# Patient Record
Sex: MALE | Race: ASIAN | NOT HISPANIC OR LATINO | URBAN - METROPOLITAN AREA
[De-identification: names, ages, dates, MRNs, and addresses within clinical notes are randomized per-mention and may not be internally consistent; named-entity substitution may affect disease eponyms.]

---

## 2020-10-08 ENCOUNTER — OFFICE VISIT (OUTPATIENT)
Dept: URBAN - METROPOLITAN AREA CLINIC 82 | Facility: CLINIC | Age: 45
End: 2020-10-08
Payer: COMMERCIAL

## 2020-10-08 DIAGNOSIS — K74.4 SECONDARY BILIARY CIRRHOSIS: ICD-10-CM

## 2020-10-08 PROCEDURE — 99214 OFFICE O/P EST MOD 30 MIN: CPT | Performed by: INTERNAL MEDICINE

## 2020-10-08 PROCEDURE — G8420 CALC BMI NORM PARAMETERS: HCPCS | Performed by: INTERNAL MEDICINE

## 2020-10-08 RX ORDER — SPIRONOLACTONE 100 MG/1
1 TABLET TABLET, FILM COATED ORAL BID
Qty: 60 TABLET | Refills: 6 | OUTPATIENT
Start: 2020-10-08 | End: 2021-05-06

## 2020-10-08 RX ORDER — FUROSEMIDE 40 MG/1
1 TABLET TABLET ORAL BID
Qty: 60 TABLET | Refills: 6 | OUTPATIENT
Start: 2020-10-08

## 2020-10-08 NOTE — HPI-TODAY'S VISIT:
The patient is currently following up for PSC.  Currently seems to be at his baseline.  He complains of chronic low energy that has not changed.  Complains of easily fatigued, dizziness in the AM.  Also has chronic LE edema that might have been worse in the past few days.  Currently compliant with lasix and aldactone 1 tab BID.  No signs of overt bleeding.  Normal BMs.

## 2020-10-08 NOTE — PHYSICAL EXAM SKIN:
HR down to 45 no rashes , no suspicious lesions , no areas of discoloration , good turgor , no masses , no tenderness on palpation, jaundiced

## 2021-03-11 ENCOUNTER — OFFICE VISIT (OUTPATIENT)
Dept: URBAN - METROPOLITAN AREA CLINIC 82 | Facility: CLINIC | Age: 46
End: 2021-03-11
Payer: COMMERCIAL

## 2021-03-11 DIAGNOSIS — K74.4 SECONDARY BILIARY CIRRHOSIS: ICD-10-CM

## 2021-03-11 PROCEDURE — 99213 OFFICE O/P EST LOW 20 MIN: CPT | Performed by: INTERNAL MEDICINE

## 2021-03-11 RX ORDER — FUROSEMIDE 40 MG/1
1 TABLET TABLET ORAL BID
Qty: 60 TABLET | Refills: 6
Start: 2020-10-08

## 2021-03-11 RX ORDER — SPIRONOLACTONE 100 MG/1
1 TABLET TABLET, FILM COATED ORAL BID
Qty: 60 TABLET | Refills: 6
Start: 2020-10-08

## 2021-03-11 RX ORDER — SPIRONOLACTONE 100 MG/1
1 TABLET TABLET, FILM COATED ORAL BID
Qty: 60 TABLET | Refills: 6 | COMMUNITY
Start: 2020-10-08 | End: 2021-05-06

## 2021-03-11 RX ORDER — FUROSEMIDE 40 MG/1
1 TABLET TABLET ORAL BID
Qty: 60 TABLET | Refills: 6 | COMMUNITY
Start: 2020-10-08

## 2021-03-11 NOTE — HPI-TODAY'S VISIT:
The patient is a pleasant 45-year-old male with PSC who is following up for management of his cirrhosis.  Since the last visit, he is compliant with his diuretics and somewhat compliant with his lactulose.  His main complaint has been chronic fatigue chronic joint pain and tingling at the hands and feet.  He has been evaluated at [facility] before for liver transplant and was deemed not a candidate due to lack of social support but the patient feels that he should be reevaluated because he is a better candidate now.

## 2021-03-12 LAB
A/G RATIO: 1
ALBUMIN: 3.1
ALKALINE PHOSPHATASE: 185
ALT (SGPT): 24
AST (SGOT): 49
BILIRUBIN, TOTAL: 2.8
BUN/CREATININE RATIO: 7
BUN: 6
CALCIUM: 8.5
CARBON DIOXIDE, TOTAL: 25
CHLORIDE: 108
CREATININE: 0.92
EGFR IF AFRICN AM: 114
EGFR IF NONAFRICN AM: 99
GLOBULIN, TOTAL: 3.2
GLUCOSE: 131
HEMATOCRIT: 44.5
HEMOGLOBIN: 15.3
INR: 1.2
MCH: 34.8
MCHC: 34.4
MCV: 101
NRBC: (no result)
PLATELETS: 120
POTASSIUM: 4
PROTEIN, TOTAL: 6.3
PROTHROMBIN TIME: 13
RBC: 4.4
RDW: 14.3
SODIUM: 144
WBC: 6.1

## 2021-04-02 ENCOUNTER — OFFICE VISIT (OUTPATIENT)
Dept: URBAN - METROPOLITAN AREA CLINIC 81 | Facility: CLINIC | Age: 46
End: 2021-04-02
Payer: COMMERCIAL

## 2021-04-02 DIAGNOSIS — K74.69 OTHER CIRRHOSIS OF LIVER: ICD-10-CM

## 2021-04-02 PROCEDURE — 76705 ECHO EXAM OF ABDOMEN: CPT | Performed by: INTERNAL MEDICINE

## 2021-09-16 ENCOUNTER — OFFICE VISIT (OUTPATIENT)
Dept: URBAN - METROPOLITAN AREA CLINIC 82 | Facility: CLINIC | Age: 46
End: 2021-09-16

## 2021-10-07 ENCOUNTER — OFFICE VISIT (OUTPATIENT)
Dept: URBAN - METROPOLITAN AREA CLINIC 82 | Facility: CLINIC | Age: 46
End: 2021-10-07
Payer: COMMERCIAL

## 2021-10-07 ENCOUNTER — DASHBOARD ENCOUNTERS (OUTPATIENT)
Age: 46
End: 2021-10-07

## 2021-10-07 DIAGNOSIS — K74.4 SECONDARY BILIARY CIRRHOSIS: ICD-10-CM

## 2021-10-07 DIAGNOSIS — R10.11 RUQ ABDOMINAL PAIN: ICD-10-CM

## 2021-10-07 PROBLEM — 12368000: Status: ACTIVE | Noted: 2020-10-08

## 2021-10-07 PROCEDURE — 99213 OFFICE O/P EST LOW 20 MIN: CPT | Performed by: INTERNAL MEDICINE

## 2021-10-07 RX ORDER — SPIRONOLACTONE 100 MG/1
1 TABLET TABLET, FILM COATED ORAL BID
Qty: 60 TABLET | Refills: 6 | Status: ACTIVE | COMMUNITY
Start: 2020-10-08

## 2021-10-07 RX ORDER — FUROSEMIDE 40 MG/1
1 TABLET TABLET ORAL BID
Qty: 60 TABLET | Refills: 6 | Status: ACTIVE | COMMUNITY
Start: 2020-10-08

## 2021-10-07 RX ORDER — SPIRONOLACTONE 100 MG/1
1 TABLET TABLET, FILM COATED ORAL BID
Qty: 60 TABLET | Refills: 6
Start: 2020-10-08 | End: 2022-05-05

## 2021-10-07 RX ORDER — FUROSEMIDE 40 MG/1
1 TABLET TABLET ORAL BID
Qty: 60 TABLET | Refills: 6
Start: 2020-10-08

## 2021-10-07 NOTE — HPI-TODAY'S VISIT:
The patient is a pleasant 45-year-old male with PSC who is following up for management of his cirrhosis.  Since the last visit, he is compliant with his diuretics and somewhat compliant with his lactulose.  His main complaint has been chronic fatigue chronic joint pain and tingling at the hands and feet.  He also has had some RUQ abdominal pain in the past few months and would like to have repeat imaging for evluation.  No changes in bowel habits.  No evidence of GI bleeding (melena,hematochezia).

## 2021-10-08 LAB
A/G RATIO: 0.6
ALBUMIN: 2.6
ALKALINE PHOSPHATASE: 267
ALT (SGPT): 52
AST (SGOT): 127
BILIRUBIN, TOTAL: 3.9
BUN/CREATININE RATIO: 10
BUN: 9
CALCIUM: 8.3
CARBON DIOXIDE, TOTAL: 24
CHLORIDE: 105
CREATININE: 0.94
EGFR IF AFRICN AM: 111
EGFR IF NONAFRICN AM: 96
GLOBULIN, TOTAL: 4.4
GLUCOSE: 83
INR: 1.4
POTASSIUM: 4.7
PROTEIN, TOTAL: 7
PROTHROMBIN TIME: 14.7
SODIUM: 140

## 2021-10-27 ENCOUNTER — TELEPHONE ENCOUNTER (OUTPATIENT)
Dept: URBAN - METROPOLITAN AREA CLINIC 92 | Facility: CLINIC | Age: 46
End: 2021-10-27

## 2021-10-28 ENCOUNTER — OUT OF OFFICE VISIT (OUTPATIENT)
Dept: URBAN - METROPOLITAN AREA MEDICAL CENTER 16 | Facility: MEDICAL CENTER | Age: 46
End: 2021-10-28
Payer: COMMERCIAL

## 2021-10-28 DIAGNOSIS — R93.2 ABN FIND-BILIARY TRACT: ICD-10-CM

## 2021-10-28 DIAGNOSIS — K74.69 CIRRHOSIS, CRYPTOGENIC: ICD-10-CM

## 2021-10-28 PROCEDURE — 99222 1ST HOSP IP/OBS MODERATE 55: CPT | Performed by: PHYSICIAN ASSISTANT

## 2021-10-28 PROCEDURE — G8427 DOCREV CUR MEDS BY ELIG CLIN: HCPCS | Performed by: PHYSICIAN ASSISTANT

## 2022-01-08 ENCOUNTER — HOSPITAL ENCOUNTER (INPATIENT)
Dept: HOSPITAL 5 - ED | Age: 47
LOS: 8 days | DRG: 441 | End: 2022-01-16
Attending: INTERNAL MEDICINE | Admitting: INTERNAL MEDICINE
Payer: MEDICAID

## 2022-01-08 DIAGNOSIS — I50.9: ICD-10-CM

## 2022-01-08 DIAGNOSIS — K72.10: ICD-10-CM

## 2022-01-08 DIAGNOSIS — F41.1: ICD-10-CM

## 2022-01-08 DIAGNOSIS — E66.9: ICD-10-CM

## 2022-01-08 DIAGNOSIS — E87.1: ICD-10-CM

## 2022-01-08 DIAGNOSIS — D75.1: ICD-10-CM

## 2022-01-08 DIAGNOSIS — I25.2: ICD-10-CM

## 2022-01-08 DIAGNOSIS — D69.6: ICD-10-CM

## 2022-01-08 DIAGNOSIS — K70.30: ICD-10-CM

## 2022-01-08 DIAGNOSIS — K72.00: Primary | ICD-10-CM

## 2022-01-08 DIAGNOSIS — E44.0: ICD-10-CM

## 2022-01-08 DIAGNOSIS — N17.9: ICD-10-CM

## 2022-01-08 DIAGNOSIS — E83.42: ICD-10-CM

## 2022-01-08 DIAGNOSIS — E87.5: ICD-10-CM

## 2022-01-08 DIAGNOSIS — Z20.822: ICD-10-CM

## 2022-01-08 DIAGNOSIS — I95.9: ICD-10-CM

## 2022-01-08 DIAGNOSIS — G93.41: ICD-10-CM

## 2022-01-08 DIAGNOSIS — I25.10: ICD-10-CM

## 2022-01-08 LAB
ALBUMIN SERPL-MCNC: 2.9 G/DL (ref 3.9–5)
ALT SERPL-CCNC: 58 UNITS/L (ref 7–56)
APTT BLD: 35.7 SEC. (ref 24.2–36.6)
BAND NEUTROPHILS # (MANUAL): 0 K/MM3
BILIRUB DIRECT SERPL-MCNC: 3.5 MG/DL (ref 0–0.2)
BUN SERPL-MCNC: 16 MG/DL (ref 9–20)
BUN/CREAT SERPL: 20 %
CALCIUM SERPL-MCNC: 8.3 MG/DL (ref 8.4–10.2)
HCT VFR BLD CALC: 53 % (ref 35.5–45.6)
HEMOLYSIS INDEX: 15
HGB BLD-MCNC: 17.6 GM/DL (ref 11.8–15.2)
INR PPP: 1.64 (ref 0.87–1.13)
MCHC RBC AUTO-ENTMCNC: 33 % (ref 32–34)
MCV RBC AUTO: 107 FL (ref 84–94)
MYELOCYTES # (MANUAL): 0 K/MM3
PLATELET # BLD: 82 K/MM3 (ref 140–440)
PROMYELOCYTES # (MANUAL): 0 K/MM3
RBC # BLD AUTO: 4.96 M/MM3 (ref 3.65–5.03)
TOTAL CELLS COUNTED BLD: 100

## 2022-01-08 PROCEDURE — 87040 BLOOD CULTURE FOR BACTERIA: CPT

## 2022-01-08 PROCEDURE — 87086 URINE CULTURE/COLONY COUNT: CPT

## 2022-01-08 PROCEDURE — 71046 X-RAY EXAM CHEST 2 VIEWS: CPT

## 2022-01-08 PROCEDURE — 76705 ECHO EXAM OF ABDOMEN: CPT

## 2022-01-08 PROCEDURE — 85025 COMPLETE CBC W/AUTO DIFF WBC: CPT

## 2022-01-08 PROCEDURE — 80053 COMPREHEN METABOLIC PANEL: CPT

## 2022-01-08 PROCEDURE — 36415 COLL VENOUS BLD VENIPUNCTURE: CPT

## 2022-01-08 PROCEDURE — 36600 WITHDRAWAL OF ARTERIAL BLOOD: CPT

## 2022-01-08 PROCEDURE — 80076 HEPATIC FUNCTION PANEL: CPT

## 2022-01-08 PROCEDURE — 84484 ASSAY OF TROPONIN QUANT: CPT

## 2022-01-08 PROCEDURE — 84145 PROCALCITONIN (PCT): CPT

## 2022-01-08 PROCEDURE — 82150 ASSAY OF AMYLASE: CPT

## 2022-01-08 PROCEDURE — 85610 PROTHROMBIN TIME: CPT

## 2022-01-08 PROCEDURE — 74177 CT ABD & PELVIS W/CONTRAST: CPT

## 2022-01-08 PROCEDURE — 85007 BL SMEAR W/DIFF WBC COUNT: CPT

## 2022-01-08 PROCEDURE — 80320 DRUG SCREEN QUANTALCOHOLS: CPT

## 2022-01-08 PROCEDURE — 71260 CT THORAX DX C+: CPT

## 2022-01-08 PROCEDURE — 83880 ASSAY OF NATRIURETIC PEPTIDE: CPT

## 2022-01-08 PROCEDURE — 85730 THROMBOPLASTIN TIME PARTIAL: CPT

## 2022-01-08 PROCEDURE — 70450 CT HEAD/BRAIN W/O DYE: CPT

## 2022-01-08 PROCEDURE — 83690 ASSAY OF LIPASE: CPT

## 2022-01-08 PROCEDURE — 82805 BLOOD GASES W/O2 SATURATION: CPT

## 2022-01-08 PROCEDURE — 80048 BASIC METABOLIC PNL TOTAL CA: CPT

## 2022-01-08 PROCEDURE — 83735 ASSAY OF MAGNESIUM: CPT

## 2022-01-08 PROCEDURE — 82140 ASSAY OF AMMONIA: CPT

## 2022-01-08 PROCEDURE — 71045 X-RAY EXAM CHEST 1 VIEW: CPT

## 2022-01-08 PROCEDURE — 74018 RADEX ABDOMEN 1 VIEW: CPT

## 2022-01-08 PROCEDURE — 86140 C-REACTIVE PROTEIN: CPT

## 2022-01-08 PROCEDURE — G0480 DRUG TEST DEF 1-7 CLASSES: HCPCS

## 2022-01-08 PROCEDURE — 84100 ASSAY OF PHOSPHORUS: CPT

## 2022-01-08 PROCEDURE — U0003 INFECTIOUS AGENT DETECTION BY NUCLEIC ACID (DNA OR RNA); SEVERE ACUTE RESPIRATORY SYNDROME CORONAVIRUS 2 (SARS-COV-2) (CORONAVIRUS DISEASE [COVID-19]), AMPLIFIED PROBE TECHNIQUE, MAKING USE OF HIGH THROUGHPUT TECHNOLOGIES AS DESCRIBED BY CMS-2020-01-R: HCPCS

## 2022-01-08 PROCEDURE — 93005 ELECTROCARDIOGRAM TRACING: CPT

## 2022-01-08 PROCEDURE — 94760 N-INVAS EAR/PLS OXIMETRY 1: CPT

## 2022-01-08 PROCEDURE — 82962 GLUCOSE BLOOD TEST: CPT

## 2022-01-08 PROCEDURE — 80307 DRUG TEST PRSMV CHEM ANLYZR: CPT

## 2022-01-08 NOTE — ULTRASOUND REPORT
ULTRASOUND ABDOMEN, LIMITED (RIGHT UPPER QUADRANT)



INDICATION:

Right upper quadrant pain. Abdominal distention. Jaundice.



COMPARISON:

None available.



FINDINGS:

PANCREAS: Not well visualized due to body habitus and bowel gas.

LIVER: 12.6 cm in length with a moderate coarse echogenic texture. No focal lesion and normal directi
onal blood flow in the main portal vein. 

GALLBLADDER: No significant abnormality. 

BILE DUCTS: No significant abnormality. Common bile duct measures 2.0 mm. 



FREE FLUID: None.

ADDITIONAL FINDINGS: None.



IMPRESSION:

1. Moderate diffuse nonspecific hepatocellular disease without focal abnormality. 

2. No evidence of gallstones. Suboptimal visualization of the pancreas sonographically.



Signer Name: Randy Ni MD 

Signed: 1/8/2022 12:33 PM

Workstation Name: [id number]

## 2022-01-08 NOTE — CAT SCAN REPORT
CT CHEST, ABDOMEN, AND PELVIS WITH IV CONTRAST



INDICATION / CLINICAL INFORMATION:

SOB, liver failure.



TECHNIQUE:

Axial CT images were obtained through the chest, abdomen, and pelvis after IV contrast. All CT scans 
at this location are performed using CT dose reduction for ALARA by means of automated exposure contr
ol. 



COMPARISON:

None available.



FINDINGS: Limited by moderate respiratory motion artifact

HEART: Cardiomegaly 

THORACIC AORTA: No significant abnormality. 

MEDIASTINUM and ERLIN: No significant abnormality.

LUNGS: No acute air space or interstitial disease.

PLEURA: No significant pleural effusion. No pneumothorax.

ADDITIONAL CHEST FINDINGS: Dilated main, right and left pulmonary arteries characteristic for pulmona
ry arterial hypertension. The main pulmonary artery measures 4.4 cm in right pulmonary artery measure
s 3.3 cm. No visualized pulmonary embolus. Bilateral symmetric gynecomastia



LIVER: No significant abnormality.

GALLBLADDER: No significant abnormality.  

BILE DUCTS: Mild biliary ductal dilatation. Common duct measures 7 mm.

PANCREAS: No significant abnormality.

SPLEEN: No significant abnormality.

ADRENALS: No significant abnormality.

RIGHT KIDNEY and URETER: No significant abnormality.

LEFT KIDNEY and URETER: No significant abnormality.



STOMACH and SMALL BOWEL: No significant abnormality. 

COLON: No significant abnormality. 

APPENDIX: No significant abnormality.  

PERITONEUM: No free fluid. No free air. No fluid collection.

LYMPH NODES: No significant adenopathy.

AORTA and ARTERIES: No significant abnormality. 

IVC and VEINS: No significant abnormality.



URINARY BLADDER: No significant abnormality.

REPRODUCTIVE ORGANS: No significant abnormality.



ADDITIONAL FINDINGS: None.



SKELETAL SYSTEM: Nonaggressive 2.5 cm lytic lesion with type Ia sclerotic borders likely represents f
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



IMPRESSION:

1. Cardiomegaly and pulmonary venous hypertension.

2. Mild emphysema

3. Mild biliary ductal dilatation.



Signer Name: Shaheed Presley MD 

Signed: 1/8/2022 5:05 PM

Workstation Name: [id number]

## 2022-01-08 NOTE — EVENT NOTE
ED Screening Note


Date of service: 01/08/22


Time: 09:57


ED Screening Note: 


 used


46-year-old [place] male presents to the ER today with complaints of 

difficulty breathing and lower extremity swelling.  Patient not a very good 

historian.  He states that he has something wrong with his liver, he has had it 

for years, is also been chronically jaundiced secondary to his liver issues.  He

states that in the past 4 to 5 days he has been having difficulty breathing, 

increased lower extremity swelling and generalized fatigue and shortness of 

breath.  He states that he has had ascites but the last time he had to have a 

paracentesis was about 3 4 years ago.  He claims that he has a GI specialist and

he is currently on spironolactone and Lasix.  He denies any nausea, vomiting, 

bowel changes or UTI symptoms.  He states that he has had subjective fever.  He 

states that he is to drink daily when he was younger but stopped over 10 years 

ago.  Past medical history also significant for CAD








This initial assessment/diagnostic orders/clinical plan/treatment(s) is/are 

subject to change based on patients health status, clinical progression and re-

assessment by fellow clinical providers in the ED. Further treatment and workup 

at subsequent clinical providers discretion. Patient/guardian urged not to elope

from the ED as their condition may be serious if not clinically assessed and 

managed. 





Initial orders include: 


Labs including EKG and chest x-ray

## 2022-01-08 NOTE — EMERGENCY DEPARTMENT REPORT
HPI





- General


Chief Complaint: Medical Clearance


Time Seen by Provider: 01/08/22 10:01





- HPI


HPI: 





46-year-old male with history of CAD and liver failure in the past of unclear 

etiology and chronicity presents complaining of proximately 5 days of progressi

ve dyspnea on exertion as well as lower extremity edema.  Patient is unable to 

identify the cause or the amount of time that he has had liver failure but says 

this has been an ongoing problem and he has developed ascites in the past, 

however, the last time he needed a paracentesis was approximately 4 years ago.  

He also says he has had subjective fever, mild dry cough, and intermittent chest

pain which she is unable to describe.  There are no known aggravating or 

alleviating factors.  He has not tried to take anything for his symptoms. he has

been taking his diuretics as prescribed. Denies any associated headache, vision 

change, neck pain, back pain, abdominal pain, nausea/vomiting, focal weakness, 

sensory changes, room spinning dizziness, dysuria, or any other complaints.  He 

is fully vaccinated against COVID-19.  He denies alcohol use.





ED Past Medical Hx





- Past Medical History


Previous Medical History?: Yes


Hx Heart Attack/AMI: Yes


Hx Liver Disease: Yes





- Social History


Smoking Status: Never Smoker


Substance Use Type: None





ED Review of Systems


ROS: 


Stated complaint: INSOMNIA X 3 NIGHTS


Other details as noted in HPI





Comment: All other systems reviewed and negative


Constitutional: fever, malaise.  denies: chills


Eyes: denies: eye pain, vision change


ENT: denies: throat pain, congestion


Respiratory: cough, shortness of breath


Cardiovascular: chest pain, edema.  denies: palpitations, syncope


Gastrointestinal: denies: abdominal pain, nausea, vomiting


Genitourinary: denies: dysuria, frequency


Musculoskeletal: denies: back pain, arthralgia


Skin: denies: rash, lesions


Neurological: denies: headache, weakness, numbness





Physical Exam





- Physical Exam


Vital Signs: 


                                   Vital Signs











  01/08/22





  05:38


 


Temperature 98.7 F


 


Pulse Rate 83


 


Respiratory 18





Rate 


 


Blood Pressure 116/71





[Left] 


 


O2 Sat by Pulse 100





Oximetry 











Physical Exam: 





GENERAL: Well developed and well nourished. No acute distress


HEAD: Normocephalic. No obvious signs of trauma. 


ENT: Slightly dry mucous membranes.


EYES: Bilateral scleral icterus. extraocular movements are intact. Pupils are 

equal round and reactive to light bilaterally


NECK: Supple. Full ROM is intact. Trachea is midline.


LUNGS: Tachypneic but in no respiratory distress.  Equal chest rise bilaterally.

  Slightly coarse breath sounds throughout without discrete rales, rhonchi, or 

wheezes.


CARDIOVASCULAR: Regular rate and rhythm. No obvious murmurs or rubs.


VASCULAR: Cap refill < 2 seconds. 3+ pitting edema of the bilateral lower 

extremities


ABDOMEN: Abdomen is distended but soft. There is no significant tenderness, 

guarding or rebound.


SKIN: Skin is jaundiced, warm and dry


NEURO: Patient is awake, alert, and oriented. CNs II-XII grossly intact. No 

focal deficits. Normal motor and sensory exam throughout. Normal speech.  


MUSCULOSKELETAL: No obvious deformities. No significant tenderness. Normal ROM 

throughout. 


BACK/SPINE: No midline tenderness or step-offs of the C/T/L spine. No 

costovertebral angle tenderness.





ED Course


                                   Vital Signs











  01/08/22





  05:38


 


Temperature 98.7 F


 


Pulse Rate 83


 


Respiratory 18





Rate 


 


Blood Pressure 116/71





[Left] 


 


O2 Sat by Pulse 100





Oximetry 














ED Medical Decision Making





- Lab Data


Result diagrams: 


                                 01/08/22 10:08





                                 01/08/22 10:10





                                   Lab Results











  01/08/22 01/08/22 01/08/22 Range/Units





  10:08 10:08 10:08 


 


WBC  8.1    (4.5-11.0)  K/mm3


 


RBC  4.96    (3.65-5.03)  M/mm3


 


Hgb  17.6 H    (11.8-15.2)  gm/dl


 


Hct  53.0 H    (35.5-45.6)  %


 


MCV  107 H    (84-94)  fl


 


MCH  35 H    (28-32)  pg


 


MCHC  33    (32-34)  %


 


RDW  15.7 H    (13.2-15.2)  %


 


Plt Count  82 L    (140-440)  K/mm3


 


Add Manual Diff  Complete    


 


Total Counted  100    


 


Seg Neuts % (Manual)  92.0 H    (40.0-70.0)  %


 


Band Neutrophils %  0    %


 


Lymphocytes % (Manual)  4.0 L    (13.4-35.0)  %


 


Reactive Lymphs % (Man)  1.0    %


 


Monocytes % (Manual)  3.0    (0.0-7.3)  %


 


Eosinophils % (Manual)  0    (0.0-4.3)  %


 


Basophils % (Manual)  0    (0.0-1.8)  %


 


Metamyelocytes %  0    %


 


Myelocytes %  0    %


 


Promyelocytes %  0    %


 


Blast Cells %  0    %


 


Nucleated RBC %  Not Reportable    


 


Seg Neutrophils # Man  7.5    (1.8-7.7)  K/mm3


 


Band Neutrophils #  0.0    K/mm3


 


Lymphocytes # (Manual)  0.3 L    (1.2-5.4)  K/mm3


 


Abs React Lymphs (Man)  0.1    K/mm3


 


Monocytes # (Manual)  0.2    (0.0-0.8)  K/mm3


 


Eosinophils # (Manual)  0.0    (0.0-0.4)  K/mm3


 


Basophils # (Manual)  0.0    (0.0-0.1)  K/mm3


 


Metamyelocytes #  0.0    K/mm3


 


Myelocytes #  0.0    K/mm3


 


Promyelocytes #  0.0    K/mm3


 


Blast Cells #  0.0    K/mm3


 


WBC Morphology  Not Reportable    


 


Hypersegmented Neuts  Not Reportable    


 


Hyposegmented Neuts  Not Reportable    


 


Hypogranular Neuts  Not Reportable    


 


Smudge Cells  Not Reportable    


 


Toxic Granulation  Not Reportable    


 


Toxic Vacuolation  Not Reportable    


 


Dohle Bodies  Not Reportable    


 


Pelger-Huet Anomaly  Not Reportable    


 


Scar Rods  Not Reportable    


 


Platelet Estimate  Consistent w auto    


 


Clumped Platelets  Not Reportable    


 


Plt Clumps, EDTA  Not Reportable    


 


Large Platelets  Not Reportable    


 


Giant Platelets  Few    


 


Platelet Satelliting  Not Reportable    


 


Plt Morphology Comment  Not Reportable    


 


RBC Morphology  Not Reportable    


 


Dimorphic RBCs  Not Reportable    


 


Polychromasia  Not Reportable    


 


Hypochromasia  Not Reportable    


 


Poikilocytosis  Not Reportable    


 


Anisocytosis  Not Reportable    


 


Microcytosis  Not Reportable    


 


Macrocytosis  Not Reportable    


 


Spherocytes  Not Reportable    


 


Pappenheimer Bodies  Not Reportable    


 


Sickle Cells  Not Reportable    


 


Target Cells  Few    


 


Tear Drop Cells  Not Reportable    


 


Ovalocytes  Not Reportable    


 


Helmet Cells  Not Reportable    


 


Leon-[place]aya Bodies  Not Reportable    


 


Cabot Rings  Not Reportable    


 


Swati Cells  Not Reportable    


 


Bite Cells  Not Reportable    


 


Crenated Cell  Not Reportable    


 


Elliptocytes  Not Reportable    


 


Acanthocytes (Spur)  Not Reportable    


 


Rouleaux  Not Reportable    


 


Hemoglobin C Crystals  Not Reportable    


 


Schistocytes  Not Reportable    


 


Malaria parasites  Not Reportable    


 


Arnie Bodies  Not Reportable    


 


Hem Pathologist Commnt  No    


 


PT   21.0 H   (12.2-14.9)  Sec.


 


INR   1.64 H   (0.87-1.13)  


 


APTT   35.7   (24.2-36.6)  Sec.


 


Sodium     (137-145)  mmol/L


 


Potassium     (3.6-5.0)  mmol/L


 


Chloride     ()  mmol/L


 


Carbon Dioxide     (22-30)  mmol/L


 


Anion Gap     mmol/L


 


BUN     (9-20)  mg/dL


 


Creatinine     (0.8-1.3)  mg/dL


 


Estimated GFR     ml/min


 


BUN/Creatinine Ratio     %


 


Glucose     ()  mg/dL


 


Calcium     (8.4-10.2)  mg/dL


 


Magnesium     (1.7-2.3)  mg/dL


 


Total Bilirubin     (0.1-1.2)  mg/dL


 


Direct Bilirubin     (0-0.2)  mg/dL


 


Indirect Bilirubin     mg/dL


 


AST     (5-40)  units/L


 


ALT     (7-56)  units/L


 


Alkaline Phosphatase     ()  units/L


 


Ammonia     (25-60)  umol/L


 


Troponin T    < 0.010  (0.00-0.029)  ng/mL


 


NT-Pro-B Natriuret Pep    2571 H  (0-450)  pg/mL


 


Total Protein     (6.3-8.2)  g/dL


 


Albumin     (3.9-5)  g/dL


 


Albumin/Globulin Ratio     %


 


Lipase    13  (13-60)  units/L


 


Salicylates     (2.8-20.0)  mg/dL


 


Acetaminophen     (10.0-30.0)  ug/mL


 


Plasma/Serum Alcohol     (0-0.07)  %














  01/08/22 01/08/22 01/08/22 Range/Units





  10:08 10:10 12:34 


 


WBC     (4.5-11.0)  K/mm3


 


RBC     (3.65-5.03)  M/mm3


 


Hgb     (11.8-15.2)  gm/dl


 


Hct     (35.5-45.6)  %


 


MCV     (84-94)  fl


 


MCH     (28-32)  pg


 


MCHC     (32-34)  %


 


RDW     (13.2-15.2)  %


 


Plt Count     (140-440)  K/mm3


 


Add Manual Diff     


 


Total Counted     


 


Seg Neuts % (Manual)     (40.0-70.0)  %


 


Band Neutrophils %     %


 


Lymphocytes % (Manual)     (13.4-35.0)  %


 


Reactive Lymphs % (Man)     %


 


Monocytes % (Manual)     (0.0-7.3)  %


 


Eosinophils % (Manual)     (0.0-4.3)  %


 


Basophils % (Manual)     (0.0-1.8)  %


 


Metamyelocytes %     %


 


Myelocytes %     %


 


Promyelocytes %     %


 


Blast Cells %     %


 


Nucleated RBC %     


 


Seg Neutrophils # Man     (1.8-7.7)  K/mm3


 


Band Neutrophils #     K/mm3


 


Lymphocytes # (Manual)     (1.2-5.4)  K/mm3


 


Abs React Lymphs (Man)     K/mm3


 


Monocytes # (Manual)     (0.0-0.8)  K/mm3


 


Eosinophils # (Manual)     (0.0-0.4)  K/mm3


 


Basophils # (Manual)     (0.0-0.1)  K/mm3


 


Metamyelocytes #     K/mm3


 


Myelocytes #     K/mm3


 


Promyelocytes #     K/mm3


 


Blast Cells #     K/mm3


 


WBC Morphology  TNR    


 


Hypersegmented Neuts     


 


Hyposegmented Neuts     


 


Hypogranular Neuts     


 


Smudge Cells     


 


Toxic Granulation     


 


Toxic Vacuolation     


 


Dohle Bodies     


 


Pelger-Huet Anomaly     


 


Scar Rods     


 


Platelet Estimate     


 


Clumped Platelets     


 


Plt Clumps, EDTA     


 


Large Platelets     


 


Giant Platelets     


 


Platelet Satelliting     


 


Plt Morphology Comment     


 


RBC Morphology     


 


Dimorphic RBCs     


 


Polychromasia     


 


Hypochromasia     


 


Poikilocytosis     


 


Anisocytosis     


 


Microcytosis     


 


Macrocytosis     


 


Spherocytes     


 


Pappenheimer Bodies     


 


Sickle Cells     


 


Target Cells     


 


Tear Drop Cells     


 


Ovalocytes     


 


Helmet Cells     


 


Leon-[place] Vaya Bodies     


 


Cabot Rings     


 


Swati Cells     


 


Bite Cells     


 


Crenated Cell     


 


Elliptocytes     


 


Acanthocytes (Spur)     


 


Rouleaux     


 


Hemoglobin C Crystals     


 


Schistocytes     


 


Malaria parasites     


 


Arnie Bodies     


 


Hem Pathologist Commnt     


 


PT     (12.2-14.9)  Sec.


 


INR     (0.87-1.13)  


 


APTT     (24.2-36.6)  Sec.


 


Sodium   112 L*   (137-145)  mmol/L


 


Potassium   5.0   (3.6-5.0)  mmol/L


 


Chloride   77.6 L   ()  mmol/L


 


Carbon Dioxide   26   (22-30)  mmol/L


 


Anion Gap   13   mmol/L


 


BUN   16   (9-20)  mg/dL


 


Creatinine   0.8   (0.8-1.3)  mg/dL


 


Estimated GFR   > 60   ml/min


 


BUN/Creatinine Ratio   20   %


 


Glucose   93   ()  mg/dL


 


Calcium   8.3 L   (8.4-10.2)  mg/dL


 


Magnesium   1.60 L   (1.7-2.3)  mg/dL


 


Total Bilirubin   8.90 H   (0.1-1.2)  mg/dL


 


Direct Bilirubin   3.5 H   (0-0.2)  mg/dL


 


Indirect Bilirubin   5.4   mg/dL


 


AST   118 H   (5-40)  units/L


 


ALT   58 H   (7-56)  units/L


 


Alkaline Phosphatase   359 H   ()  units/L


 


Ammonia     (25-60)  umol/L


 


Troponin T    < 0.010  (0.00-0.029)  ng/mL


 


NT-Pro-B Natriuret Pep     (0-450)  pg/mL


 


Total Protein   6.9   (6.3-8.2)  g/dL


 


Albumin   2.9 L   (3.9-5)  g/dL


 


Albumin/Globulin Ratio   0.7   %


 


Lipase     (13-60)  units/L


 


Salicylates     (2.8-20.0)  mg/dL


 


Acetaminophen     (10.0-30.0)  ug/mL


 


Plasma/Serum Alcohol     (0-0.07)  %














  01/08/22 01/08/22 01/08/22 Range/Units





  12:34 12:34 12:34 


 


WBC     (4.5-11.0)  K/mm3


 


RBC     (3.65-5.03)  M/mm3


 


Hgb     (11.8-15.2)  gm/dl


 


Hct     (35.5-45.6)  %


 


MCV     (84-94)  fl


 


MCH     (28-32)  pg


 


MCHC     (32-34)  %


 


RDW     (13.2-15.2)  %


 


Plt Count     (140-440)  K/mm3


 


Add Manual Diff     


 


Total Counted     


 


Seg Neuts % (Manual)     (40.0-70.0)  %


 


Band Neutrophils %     %


 


Lymphocytes % (Manual)     (13.4-35.0)  %


 


Reactive Lymphs % (Man)     %


 


Monocytes % (Manual)     (0.0-7.3)  %


 


Eosinophils % (Manual)     (0.0-4.3)  %


 


Basophils % (Manual)     (0.0-1.8)  %


 


Metamyelocytes %     %


 


Myelocytes %     %


 


Promyelocytes %     %


 


Blast Cells %     %


 


Nucleated RBC %     


 


Seg Neutrophils # Man     (1.8-7.7)  K/mm3


 


Band Neutrophils #     K/mm3


 


Lymphocytes # (Manual)     (1.2-5.4)  K/mm3


 


Abs React Lymphs (Man)     K/mm3


 


Monocytes # (Manual)     (0.0-0.8)  K/mm3


 


Eosinophils # (Manual)     (0.0-0.4)  K/mm3


 


Basophils # (Manual)     (0.0-0.1)  K/mm3


 


Metamyelocytes #     K/mm3


 


Myelocytes #     K/mm3


 


Promyelocytes #     K/mm3


 


Blast Cells #     K/mm3


 


WBC Morphology     


 


Hypersegmented Neuts     


 


Hyposegmented Neuts     


 


Hypogranular Neuts     


 


Smudge Cells     


 


Toxic Granulation     


 


Toxic Vacuolation     


 


Dohle Bodies     


 


Pelger-Huet Anomaly     


 


Scar Rods     


 


Platelet Estimate     


 


Clumped Platelets     


 


Plt Clumps, EDTA     


 


Large Platelets     


 


Giant Platelets     


 


Platelet Satelliting     


 


Plt Morphology Comment     


 


RBC Morphology     


 


Dimorphic RBCs     


 


Polychromasia     


 


Hypochromasia     


 


Poikilocytosis     


 


Anisocytosis     


 


Microcytosis     


 


Macrocytosis     


 


Spherocytes     


 


Pappenheimer Bodies     


 


Sickle Cells     


 


Target Cells     


 


Tear Drop Cells     


 


Ovalocytes     


 


Helmet Cells     


 


Leon-Ko Vaya Bodies     


 


Cabot Rings     


 


[place] Cells     


 


Bite Cells     


 


Crenated Cell     


 


Elliptocytes     


 


Acanthocytes (Spur)     


 


Rouleaux     


 


Hemoglobin C Crystals     


 


Schistocytes     


 


Malaria parasites     


 


Arnie Bodies     


 


Hem Pathologist Commnt     


 


PT     (12.2-14.9)  Sec.


 


INR     (0.87-1.13)  


 


APTT     (24.2-36.6)  Sec.


 


Sodium     (137-145)  mmol/L


 


Potassium     (3.6-5.0)  mmol/L


 


Chloride     ()  mmol/L


 


Carbon Dioxide     (22-30)  mmol/L


 


Anion Gap     mmol/L


 


BUN     (9-20)  mg/dL


 


Creatinine     (0.8-1.3)  mg/dL


 


Estimated GFR     ml/min


 


BUN/Creatinine Ratio     %


 


Glucose     ()  mg/dL


 


Calcium     (8.4-10.2)  mg/dL


 


Magnesium     (1.7-2.3)  mg/dL


 


Total Bilirubin     (0.1-1.2)  mg/dL


 


Direct Bilirubin     (0-0.2)  mg/dL


 


Indirect Bilirubin     mg/dL


 


AST     (5-40)  units/L


 


ALT     (7-56)  units/L


 


Alkaline Phosphatase     ()  units/L


 


Ammonia  40.0    (25-60)  umol/L


 


Troponin T     (0.00-0.029)  ng/mL


 


NT-Pro-B Natriuret Pep     (0-450)  pg/mL


 


Total Protein     (6.3-8.2)  g/dL


 


Albumin     (3.9-5)  g/dL


 


Albumin/Globulin Ratio     %


 


Lipase     (13-60)  units/L


 


Salicylates    < 0.3 L  (2.8-20.0)  mg/dL


 


Acetaminophen     (10.0-30.0)  ug/mL


 


Plasma/Serum Alcohol   < 0.01   (0-0.07)  %














  01/08/22 Range/Units





  12:34 


 


WBC   (4.5-11.0)  K/mm3


 


RBC   (3.65-5.03)  M/mm3


 


Hgb   (11.8-15.2)  gm/dl


 


Hct   (35.5-45.6)  %


 


MCV   (84-94)  fl


 


MCH   (28-32)  pg


 


MCHC   (32-34)  %


 


RDW   (13.2-15.2)  %


 


Plt Count   (140-440)  K/mm3


 


Add Manual Diff   


 


Total Counted   


 


Seg Neuts % (Manual)   (40.0-70.0)  %


 


Band Neutrophils %   %


 


Lymphocytes % (Manual)   (13.4-35.0)  %


 


Reactive Lymphs % (Man)   %


 


Monocytes % (Manual)   (0.0-7.3)  %


 


Eosinophils % (Manual)   (0.0-4.3)  %


 


Basophils % (Manual)   (0.0-1.8)  %


 


Metamyelocytes %   %


 


Myelocytes %   %


 


Promyelocytes %   %


 


Blast Cells %   %


 


Nucleated RBC %   


 


Seg Neutrophils # Man   (1.8-7.7)  K/mm3


 


Band Neutrophils #   K/mm3


 


Lymphocytes # (Manual)   (1.2-5.4)  K/mm3


 


Abs React Lymphs (Man)   K/mm3


 


Monocytes # (Manual)   (0.0-0.8)  K/mm3


 


Eosinophils # (Manual)   (0.0-0.4)  K/mm3


 


Basophils # (Manual)   (0.0-0.1)  K/mm3


 


Metamyelocytes #   K/mm3


 


Myelocytes #   K/mm3


 


Promyelocytes #   K/mm3


 


Blast Cells #   K/mm3


 


WBC Morphology   


 


Hypersegmented Neuts   


 


Hyposegmented Neuts   


 


Hypogranular Neuts   


 


Smudge Cells   


 


Toxic Granulation   


 


Toxic Vacuolation   


 


Dohle Bodies   


 


Pelger-Huet Anomaly   


 


Scar Rods   


 


Platelet Estimate   


 


Clumped Platelets   


 


Plt Clumps, EDTA   


 


Large Platelets   


 


Giant Platelets   


 


Platelet Satelliting   


 


Plt Morphology Comment   


 


RBC Morphology   


 


Dimorphic RBCs   


 


Polychromasia   


 


Hypochromasia   


 


Poikilocytosis   


 


Anisocytosis   


 


Microcytosis   


 


Macrocytosis   


 


Spherocytes   


 


Pappenheimer Bodies   


 


Sickle Cells   


 


Target Cells   


 


Tear Drop Cells   


 


Ovalocytes   


 


Helmet Cells   


 


Leon-Ko Vaya Bodies   


 


Cabot Rings   


 


[place] Cells   


 


Bite Cells   


 


Crenated Cell   


 


Elliptocytes   


 


Acanthocytes (Spur)   


 


Rouleaux   


 


Hemoglobin C Crystals   


 


Schistocytes   


 


Malaria parasites   


 


Arnie Bodies   


 


Hem Pathologist Commnt   


 


PT   (12.2-14.9)  Sec.


 


INR   (0.87-1.13)  


 


APTT   (24.2-36.6)  Sec.


 


Sodium   (137-145)  mmol/L


 


Potassium   (3.6-5.0)  mmol/L


 


Chloride   ()  mmol/L


 


Carbon Dioxide   (22-30)  mmol/L


 


Anion Gap   mmol/L


 


BUN   (9-20)  mg/dL


 


Creatinine   (0.8-1.3)  mg/dL


 


Estimated GFR   ml/min


 


BUN/Creatinine Ratio   %


 


Glucose   ()  mg/dL


 


Calcium   (8.4-10.2)  mg/dL


 


Magnesium   (1.7-2.3)  mg/dL


 


Total Bilirubin   (0.1-1.2)  mg/dL


 


Direct Bilirubin   (0-0.2)  mg/dL


 


Indirect Bilirubin   mg/dL


 


AST   (5-40)  units/L


 


ALT   (7-56)  units/L


 


Alkaline Phosphatase   ()  units/L


 


Ammonia   (25-60)  umol/L


 


Troponin T   (0.00-0.029)  ng/mL


 


NT-Pro-B Natriuret Pep   (0-450)  pg/mL


 


Total Protein   (6.3-8.2)  g/dL


 


Albumin   (3.9-5)  g/dL


 


Albumin/Globulin Ratio   %


 


Lipase   (13-60)  units/L


 


Salicylates   (2.8-20.0)  mg/dL


 


Acetaminophen  5.0 L  (10.0-30.0)  ug/mL


 


Plasma/Serum Alcohol   (0-0.07)  %














- EKG Data


-: EKG Interpreted by Me





- EKG Data





01/08/22 13:59


Normal sinus rhythm.  Right axis deviation.  QTC of 556.  Otherwise normal 

intervals.  No ectopy.  No significant ST segment abnormalities.  Nonspecific T 

wave inversions.





- Radiology Data


Radiology results: report reviewed





- Medical Decision Making





46-year-old male with history of CAD and liver failure of unclear etiology 

presents complaining of 5 days of progressive lower extremity swelling and 

dyspnea on exertion.  Also reports subjective fevers and malaise as well as 

cough and intermittent chest pain.  He is afebrile and with normal vital signs. 

 On physical examination he has bilateral scleral icterus and jaundice.  His 

abdomen is distended but soft and nontender.  He has a nonfocal neurologic exam.

  He has 3+ pitting edema bilaterally.  Lung sounds are slightly coarse but 

otherwise clear.  We will perform broad work-up with a full set of labs, EKG, 

chest x-ray as well as ultrasound of the abdomen and right upper quadrant.  We 

will continue to monitor the patient closely and give meds as appropriate.





I received a call from the lab without a critically low sodium level of 112.  

Labs further reveal normal kidney function and no other significant electrolyte 

abnormalities however T bili is elevated at 8.9 with T bili of 3.5.  BNP is 

elevated at 2571.  AST is elevated at 112 and ALT is 58.  Troponin is negative. 

 INR is slightly elevated at 1.64.  There is no significant leukocytosis or 

anemia but hemoglobin is elevated at 17.6.  Given the degree of hyponatremia, I 

have ordered normal saline at 125 mL/h.  In addition we will obtain broader set 

of labs to include ammonia level.  We will obtain CT of the head to assess for 

evidence of intracranial bleeding versus other abnormality to explain the 

patient's symptoms.  We will obtain CT of the chest/abdomen/pelvis to assess for

 evidence of pneumonia, fluid overload, colitis, 

choledocholithiasis/cholecystitis, versus other abnormality to explain the 

patient's presentation.  I immediately alerted the charge nurse Dayna that the 

patient requires a bed in the main ED with a cardiac monitor.





Chest x-ray shows cardiomegaly without CHF findings.





At 11:32 PM I spoke with Dr. Soriano of gastroenterology regarding the case.  He 

agrees with current work-up and management and will provide further inpatient 

consults.





Acetaminophen and salicylate levels are negative.  Ammonia level is within 

normal limits.





EKG shows prolonged QTC of 556.  In light of magnesium level of 1.6 I ordered 2 

mg of IV magnesium repletion.





CT of the head reveals periventricular and deep white matter lucency of unclear 

etiology which the radiologist feels may represent advanced microvascular 

ischemic changes.





CT of the chest/abdomen/pelvis reveals cardiomegaly and mildly dilated CBD of 7 

mm.





I spoke with Dr. Bansal, the on-call hospitalist regarding the case and he accepts

 the patient for admission and will assume care.


Critical Care Time: Yes


Critical care time in (mins) excluding proc time.: 60


Critical care attestation.: 


If time is entered above; I have spent that time in minutes in the direct care 

of this critically ill patient, excluding procedure time.


Critical care time was spent in the evaluation/assessment, work-up, and 

management of metabolic encephalopathy with severe hyponatremia and acute on 

chronic liver failure with elevated INR and hypomagnesemia as well as prolonged 

QTC requiring administration of IV magnesium as well as abnormal head CT finding

 and consultation with specialist with frequent repeat assessment and reevaluati

on





ED Disposition


Clinical Impression: 


 Liver failure, Metabolic encephalopathy, Hyponatremia, Cirrhosis of liver, 

Heart failure, Prolonged QT interval, Hypomagnesemia, Jaundice, Elevated INR, 

Abnormal head CT, Ischemic encephalopathy, Thrombocytopenia





Disposition: 09 ADMITTED AS INPATIENT


Is pt being admited?: Yes


Condition: Critical

## 2022-01-08 NOTE — CAT SCAN REPORT
CT HEAD WITHOUT CONTRAST



INDICATION / CLINICAL INFORMATION:

AMS.



TECHNIQUE:

All CT scans at this location are performed using CT dose reduction for ALARA by means of automated e
xposure control. 



COMPARISON:

None available.



FINDINGS:

HEMORRHAGE: No evidence of intracranial hemorrhage or extra-axial fluid collection.

EXTRA-AXIAL SPACES: Cortical sulci, sylvian fissures and basilar cisterns have an unremarkable appear
ance.

VENTRICULAR SYSTEM: Developmental asymmetry of the lateral ventricles is noted, left larger than righ
t. Lateral ventricles are otherwise unremarkable. Third ventricle is normal in size.

CEREBRAL PARENCHYMA: Periventricular and deep white matter lucency is noted. This may reflect advance
d microvascular ischemic change. Are there risk factors such as hypertension, diabetes or cigarettes 
smoking Alternatively in the setting of hypertensive crisis the possibility of PRES could be consider
ed.

MIDLINE SHIFT OR HERNIATION: There is no mass effect.

CEREBELLUM / BRAINSTEM: Brainstem and cerebellum have an unremarkable appearance.

MIDLINE STRUCTURES:No abnormalities of the pituitary gland or pineal region are identified.

INTRACRANIAL VESSELS:No abnormalities are identified on this noncontrast head CT.

ORBITS: visualized portions of the orbits have an unremarkable appearance.

SOFT TISSUES of HEAD: No significant abnormality.

CALVARIUM: Evaluation of bone windows reveals no abnormalities.

PARANASAL SINUSES / MASTOID AIR CELLS: Visualized portions of the paranasal sinuses are free from inf
lammatory mucosal disease. Mastoid air cells are normally pneumatized.



IMPRESSION:

1. Periventricular and deep white matter lucency. This is considered an abnormal finding in this 46-y
ear-old individual. Please refer to the above discussion. 

2. No additional abnormality on head CT without contrast.



Signer Name: Armando Conner MD 

Signed: 1/8/2022 4:42 PM

Workstation Name: [email or web address]

## 2022-01-08 NOTE — XRAY REPORT
CHEST 2 VIEWS 



INDICATION / CLINICAL INFORMATION:

Chest Pain/sob.



COMPARISON: 

None available.



FINDINGS:



SUPPORT DEVICES: None.

HEART / MEDIASTINUM: Cardiomegaly 

LUNGS / PLEURA: No significant pulmonary or pleural abnormality. No pneumothorax. 



ADDITIONAL FINDINGS: No significant additional findings.



IMPRESSION:

1. Cardiomegaly without CHF



Signer Name: Shaheed Presley MD 

Signed: 1/8/2022 10:36 AM

Workstation Name: [id number]

## 2022-01-08 NOTE — CONSULTATION
DATE OF CONSULTATION: 01/08/2022



REFERRING PHYSICIAN:  Fuentes Bansal M.D.



INDICATION:

1.  Cirrhosis.

2.  Liver disease.



HISTORY OF PRESENT ILLNESS:  A 46-year-old  male with history of heart 

disease and liver failure presents for evaluation.  The patient reports 

progressive dyspnea and shortness of breath with lower extremity edema.  The 

patient reports a history of cirrhosis in the past.  He does report a history of

alcohol abuse, though is a poor historian as to when he stopped, but generally 

says a few years ago.  The patient reports he has had progressive shortness of 

breath.  He denies any abdominal pain.  Denies any weight loss.  Denies any 

recent diet or medication changes.  The patient subsequently came to the 

Emergency Room, was noted to have hyponatremia and some liver abnormalities and 

was admitted and GI consulted.  No other specific complaints.  Of note, the 

patient is COVID vaccinated.



PAST MEDICAL HISTORY:

1.  Cirrhosis of unclear etiology, though suspect alcohol.

2.  Coronary artery disease.



MEDICATIONS:  Reviewed and updated in chart.



ALLERGIES:  No known drug allergies.



SOCIAL HISTORY:  Reports alcohol use, but not for recent years.



FAMILY HISTORY:  Negative for colon cancer, IBD, or liver disease.



REVIEW OF SYSTEMS:

GENERAL:  Reports some weakness.

HEENT:  No visual complaints or tinnitus.

PULMONARY:  Reports mild shortness of breath, chest pain.

GASTROINTESTINAL:  Reports mild abdominal discomfort.



All points of 10-point review of systems otherwise negative.



PHYSICAL EXAMINATION:

VITAL SIGNS:  Temperature of 97.8, pulse 85, respirations 18, blood pressure 

116/71.

GENERAL:  Fairly thin female in no acute distress.

HEENT:  Pupils round and reactive.

PULMONARY:  Clear to auscultation bilaterally.

CARDIOVASCULAR:  Regular rate and rhythm.  Normal S1, S2.

ABDOMEN:  Positive bowel sounds, soft, mildly distended.  No guarding, no 

rebound.

SKIN:  No obvious rashes.



LABORATORY DATA:  Pertinent for a white count of 8.1, hemoglobin and hematocrit 

of 17.6 and 53, platelet count 82.  INR of 1.64, sodium of 112, potassium 5, 

chloride 78, CO2 of 26, BUN and creatinine of 16 and 0.8, AST and ALT of 118 and

58, alkaline phosphatase 359 with a total bilirubin of 8.9.  Tylenol level 

negative.  Salicylate level negative.  CT abdomen and pelvis with contrast 

performed on 01/08 showed cardiomegaly with increased pulmonary vasculature, 

mild emphysema, but otherwise benign.  Ultrasound performed also on 01/08 of the

abdomen showed moderate diffuse nonspecific hepatocellular disease.  No evidence

of gallstones.



ASSESSMENT:  A 46-year-old  male presents with signs and symptoms of 

cirrhosis in the setting of reporting alcohol use, but not in recent years.  

Also, now with hyponatremia.  The patient's liver labs for the most part are 

stable.  His ultrasound and CT scan do not show severe ascites.  It is unclear 

as to the etiology of his hyponatremia.  Management as noted below.



PLAN:

1.  We will review ultrasound and CT.

2.  We will do sepsis workup including blood cultures.

3.  Hyponatremia.  Management per primary team.

4.  No obvious signs that are needed for ascites at this time.

5.  We would start gentle diuretic with Lasix and spironolactone per primary 

team.

6.  We will follow further recommendation based on progress.







DD: 01/08/2022 09:33 PM

DT: 01/08/2022 11:49 PM

TID: [id number] RECEIPT: [id number]

ENRIQUE/ESTEBAN

## 2022-01-09 LAB
ALBUMIN SERPL-MCNC: 2.1 G/DL (ref 3.9–5)
ALBUMIN SERPL-MCNC: 2.5 G/DL (ref 3.9–5)
ALT SERPL-CCNC: 48 UNITS/L (ref 7–56)
ALT SERPL-CCNC: 60 UNITS/L (ref 7–56)
ANISOCYTOSIS BLD QL SMEAR: (no result)
BAND NEUTROPHILS # (MANUAL): 0 K/MM3
BUN SERPL-MCNC: 21 MG/DL (ref 9–20)
BUN SERPL-MCNC: 28 MG/DL (ref 9–20)
BUN/CREAT SERPL: 26 %
BUN/CREAT SERPL: 31 %
CALCIUM SERPL-MCNC: 7.5 MG/DL (ref 8.4–10.2)
CALCIUM SERPL-MCNC: 8.3 MG/DL (ref 8.4–10.2)
HCT VFR BLD CALC: 50.2 % (ref 35.5–45.6)
HEMOLYSIS INDEX: 166
HEMOLYSIS INDEX: 171
HGB BLD-MCNC: 16.8 GM/DL (ref 11.8–15.2)
MCHC RBC AUTO-ENTMCNC: 33 % (ref 32–34)
MCV RBC AUTO: 107 FL (ref 84–94)
MYELOCYTES # (MANUAL): 0 K/MM3
PLATELET # BLD: 94 K/MM3 (ref 140–440)
PROMYELOCYTES # (MANUAL): 0 K/MM3
RBC # BLD AUTO: 4.68 M/MM3 (ref 3.65–5.03)
TOTAL CELLS COUNTED BLD: 100

## 2022-01-09 RX ADMIN — KETOROLAC TROMETHAMINE SCH MG: 30 INJECTION, SOLUTION INTRAMUSCULAR at 13:13

## 2022-01-09 RX ADMIN — KETOROLAC TROMETHAMINE SCH: 30 INJECTION, SOLUTION INTRAMUSCULAR at 23:29

## 2022-01-09 RX ADMIN — Medication SCH ML: at 12:55

## 2022-01-09 RX ADMIN — FAMOTIDINE SCH MG: 10 INJECTION, SOLUTION INTRAVENOUS at 13:00

## 2022-01-09 RX ADMIN — FAMOTIDINE SCH MG: 10 INJECTION, SOLUTION INTRAVENOUS at 23:17

## 2022-01-09 RX ADMIN — SODIUM CHLORIDE ONE MLS/HR: 3 INJECTION, SOLUTION INTRAVENOUS at 19:01

## 2022-01-09 RX ADMIN — KETOROLAC TROMETHAMINE SCH MG: 30 INJECTION, SOLUTION INTRAMUSCULAR at 00:48

## 2022-01-09 RX ADMIN — Medication SCH ML: at 23:17

## 2022-01-09 RX ADMIN — KETOROLAC TROMETHAMINE SCH: 30 INJECTION, SOLUTION INTRAMUSCULAR at 06:53

## 2022-01-09 NOTE — GASTROENTEROLOGY PROGRESS NOTE
Assessment and Plan


1. 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 

disease


- pt w/ ascites but no need for paracenthesis


- pt ammonia stable,


- diet as tolerated 


- Na management per primary team


- watch for signs possible alcohol withdrawal


- Na management per primary team


-will follow








Subjective


Date of service: 01/09/22


Principal diagnosis: Hyponatremia,Liver failure


Interval history: 


pt somewhat lethargic today, no specific issues overnight








Objective





- Constitutional


Vitals: 


                                        











Temp Pulse Resp BP Pulse Ox


 


 98 F   78   16   91/56   97 


 


 01/09/22 17:04  01/09/22 17:07  01/09/22 17:04  01/09/22 17:04  01/09/22 17:04











General appearance: no acute distress





- EENT


Eyes: PERRL





- Respiratory


Respiratory: bilateral: CTA





- Cardiovascular


Rhythm: regular


Heart Sounds: Present: S1 & S2





- Gastrointestinal


General gastrointestinal: Present: soft, non-tender, non-distended





- Labs


CBC & Chem 7: 


                                 01/09/22 04:58





                                 01/09/22 04:58


Labs: 


                         Laboratory Results - last 24 hr











  01/09/22 01/09/22





  04:58 04:58


 


WBC  11.9 H 


 


RBC  4.68 


 


Hgb  16.8 H 


 


Hct  50.2 H 


 


MCV  107 H 


 


MCH  36 H 


 


MCHC  33 


 


RDW  15.9 H 


 


Plt Count  94 L 


 


Add Manual Diff  Complete 


 


Total Counted  100 


 


Seg Neuts % (Manual)  94.0 H 


 


Band Neutrophils %  0 


 


Lymphocytes % (Manual)  5.0 L 


 


Reactive Lymphs % (Man)  0 


 


Monocytes % (Manual)  1.0 


 


Eosinophils % (Manual)  0 


 


Basophils % (Manual)  0 


 


Metamyelocytes %  0 


 


Myelocytes %  0 


 


Promyelocytes %  0 


 


Blast Cells %  0 


 


Nucleated RBC %  Not Reportable 


 


Seg Neutrophils # Man  11.2 H 


 


Band Neutrophils #  0.0 


 


Lymphocytes # (Manual)  0.6 L 


 


Abs React Lymphs (Man)  0.0 


 


Monocytes # (Manual)  0.1 


 


Eosinophils # (Manual)  0.0 


 


Basophils # (Manual)  0.0 


 


Metamyelocytes #  0.0 


 


Myelocytes #  0.0 


 


Promyelocytes #  0.0 


 


Blast Cells #  0.0 


 


WBC Morphology  Not Reportable 


 


Hypersegmented Neuts  Not Reportable 


 


Hyposegmented Neuts  Not Reportable 


 


Hypogranular Neuts  Not Reportable 


 


Smudge Cells  Not Reportable 


 


Toxic Granulation  Not Reportable 


 


Toxic Vacuolation  Not Reportable 


 


Dohle Bodies  Not Reportable 


 


Pelger-Huet Anomaly  Not Reportable 


 


Scar Rods  Not Reportable 


 


Platelet Estimate  Consistent w auto 


 


Clumped Platelets  Not Reportable 


 


Plt Clumps, EDTA  Not Reportable 


 


Large Platelets  Not Reportable 


 


Giant Platelets  Not Reportable 


 


Platelet Satelliting  Not Reportable 


 


Plt Morphology Comment  Not Reportable 


 


RBC Morphology  Not Reportable 


 


Dimorphic RBCs  Not Reportable 


 


Polychromasia  Not Reportable 


 


Hypochromasia  Not Reportable 


 


Poikilocytosis  Not Reportable 


 


Anisocytosis  1+ 


 


Microcytosis  Not Reportable 


 


Macrocytosis  Not Reportable 


 


Spherocytes  Not Reportable 


 


Pappenheimer Bodies  Not Reportable 


 


Sickle Cells  Not Reportable 


 


Target Cells  Not Reportable 


 


Tear Drop Cells  Not Reportable 


 


Ovalocytes  Not Reportable 


 


Helmet Cells  Not Reportable 


 


Leon-Finley Bodies  Not Reportable 


 


Cabot Rings  Not Reportable 


 


Swati Cells  Not Reportable 


 


Bite Cells  Not Reportable 


 


Crenated Cell  Not Reportable 


 


Elliptocytes  Not Reportable 


 


Acanthocytes (Spur)  Not Reportable 


 


Rouleaux  Not Reportable 


 


Hemoglobin C Crystals  Not Reportable 


 


Schistocytes  Not Reportable 


 


Malaria parasites  Not Reportable 


 


Arnie Bodies  Not Reportable 


 


Hem Pathologist Commnt  No 


 


Sodium   116 L*


 


Potassium   5.7 H


 


Chloride   81.4 L


 


Carbon Dioxide   22


 


Anion Gap   18


 


BUN   21 H


 


Creatinine   0.8


 


Estimated GFR   > 60


 


BUN/Creatinine Ratio   26


 


Glucose   55 L


 


Calcium   8.3 L


 


Total Bilirubin   9.80 H


 


AST   129 H


 


ALT   60 H


 


Alkaline Phosphatase   303 H


 


Total Protein   6.4


 


Albumin   2.5 L


 


Albumin/Globulin Ratio   0.6

## 2022-01-09 NOTE — PROGRESS NOTE
Assessment and Plan


Critical care statement


The high probability OF a clinically significant sudden or life-threatening 

deterioration of the cardiorespiratory system and endocrine system required my 

full and direct attention, intervention and postoperative management.  The 

aggregate medical care time was 40 minutes.  The time is in addition to time 

spent performing reported procedures but includes the followin: Data review and interpretation


2: Patient assessment and monitoring of vital signs


3: Documentation


4:: Medication orders and management





- Patient Problems


(1) Hyponatremia


Current Visit: Yes   Status: Acute   


Plan to address problem: 


Check urine osmolarity and serum osmolarity


Sodium improved from 112 to 116


3 percent NS --500 ml over 10 hours








(2) Liver failure


Current Visit: Yes   Status: Chronic   


Qualifiers: 


   Liver failure chronicity: chronic   Hepatic coma status: without hepatic coma

  Qualified Code(s): K72.10 - Chronic hepatic failure without coma   


Plan to address problem: 


Patient has end-stage lung disease


Elevated total bili of 8.9 and direct bili of 3.5, AST is 118 and ALT is 58








(3) Hypotension


Current Visit: Yes   Status: Acute   


Qualifiers: 


   Hypotension type: unspecified hypotension type   Qualified Code(s): I95.9 - 

Hypotension, unspecified   


Plan to address problem: 


Patient is on Levophed at 20 mics 








(4) Cirrhosis of liver


Current Visit: Yes   Status: Chronic   


Qualifiers: 


   Ascites presence: without ascites 


Plan to address problem: 


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


Patient is a poor historian


Says he has no family











(5) Hypomagnesemia


Current Visit: Yes   Status: Acute   


Plan to address problem: 


Supplemented with IV magnesium








(6) Polycythemia due to fall in plasma volume


Current Visit: Yes   Status: Acute   


Plan to address problem: 


Gentle hydration given the elevated BNP








(7) Malnutrition


Current Visit: Yes   Status: Chronic   


Qualifiers: 


   Protein-calorie malnutrition severity: moderate 


Plan to address problem: 


Dietary supplements requested








(8) Elevated brain natriuretic peptide (BNP) level


Current Visit: Yes   Status: Acute   


Plan to address problem: 


Echocardiogram for ejection fraction and valve function








(9) DVT prophylaxis


Current Visit: Yes   Status: Acute   


Plan to address problem: 


SCDs and GI prophylaxis








Subjective


Date of service: 22


Principal diagnosis: Hyponatremia,Liver failure


Interval history: 





46-year-old male with history of CAD and liver failure in the past of unclear 

etiology and chronicity presents complaining of proximately 5 days of pro

gressive dyspnea on exertion as well as lower extremity edema.  Patient is 

unable to identify the cause or the amount of time that he has had liver failure

but says this has been an ongoing problem and he has developed ascites in the 

past, however, the last time he needed a paracentesis was approximately 4 years 

ago.  He also says he has had subjective fever, mild dry cough, and intermittent

chest pain which she is unable to describe.  There are no known aggravating or 

alleviating factors.  He has not tried to take anything for his symptoms. he has

been taking his diuretics as prescribed. Denies any associated headache, vision 

change, neck pain, back pain, abdominal pain, nausea/vomiting, focal weakness, 

sensory changes, room spinning dizziness, dysuria, or any other complaints.  He 

is fully vaccinated against COVID-19.  He denies alcohol use.





22


Na112 to 116Sx better











Objective





- Constitutional


Vitals: 


                               Vital Signs - 12hr











  22





  05:15 05:30 05:45


 


Temperature   


 


Pulse Rate   


 


Respiratory   





Rate   


 


Blood Pressure 112/66 100/82 116/80


 


O2 Sat by Pulse 96 96 96





Oximetry   














  22





  06:00 06:15 06:30


 


Temperature   


 


Pulse Rate   


 


Respiratory   





Rate   


 


Blood Pressure 113/72 106/64 113/72


 


O2 Sat by Pulse 95 95 96





Oximetry   














  22





  06:46 17:04 17:07


 


Temperature  98 F 


 


Pulse Rate  73 78


 


Respiratory  16 





Rate   


 


Blood Pressure 116/70 91/56 


 


O2 Sat by Pulse 96 97 





Oximetry   











General appearance: Present: no acute distress, well-nourished





- EENT


Eyes: PERRL, EOM intact


ENT: hearing intact, clear oral mucosa


Ears: bilateral: normal





- Neck


Neck: supple, normal ROM





- Respiratory


Respiratory effort: normal


Respiratory: bilateral: CTA





- Breasts


Breasts: normal





- Cardiovascular


Heart rate: 78


Rhythm: regular


Heart Sounds: Present: S1 & S2.  Absent: gallop, rub


Extremities: pulses intact, No edema, normal color, Full ROM





- Gastrointestinal


General gastrointestinal: Present: soft, non-tender, non-distended, normal bowel

sounds





- Genitourinary


Male genitourinary: normal





- Integumentary


Integumentary: clear, warm, dry





- Musculoskeletal


Musculoskeletal: 1, strength equal bilaterally





- Neurologic


Neurologic: moves all extremities





- Psychiatric


Psychiatric: memory intact, appropriate mood/affect, intact judgment & insight





- Allied health notes


Allied health notes reviewed: nursing, case management





- Labs


CBC & Chem 7: 


                                 22 04:58





                                 22 04:58


Labs: 


                              Abnormal lab results











  22 Range/Units





  04:58 04:58 


 


WBC  11.9 H   (4.5-11.0)  K/mm3


 


Hgb  16.8 H   (11.8-15.2)  gm/dl


 


Hct  50.2 H   (35.5-45.6)  %


 


MCV  107 H   (84-94)  fl


 


MCH  36 H   (28-32)  pg


 


RDW  15.9 H   (13.2-15.2)  %


 


Plt Count  94 L   (140-440)  K/mm3


 


Seg Neuts % (Manual)  94.0 H   (40.0-70.0)  %


 


Lymphocytes % (Manual)  5.0 L   (13.4-35.0)  %


 


Seg Neutrophils # Man  11.2 H   (1.8-7.7)  K/mm3


 


Lymphocytes # (Manual)  0.6 L   (1.2-5.4)  K/mm3


 


Sodium   116 L*  (137-145)  mmol/L


 


Potassium   5.7 H  (3.6-5.0)  mmol/L


 


Chloride   81.4 L  ()  mmol/L


 


BUN   21 H  (9-20)  mg/dL


 


Glucose   55 L  ()  mg/dL


 


Calcium   8.3 L  (8.4-10.2)  mg/dL


 


Total Bilirubin   9.80 H  (0.1-1.2)  mg/dL


 


AST   129 H  (5-40)  units/L


 


ALT   60 H  (7-56)  units/L


 


Alkaline Phosphatase   303 H  ()  units/L


 


Albumin   2.5 L  (3.9-5)  g/dL














HEART Score





- HEART Score


Troponin: 


                                        











Troponin T  < 0.010 ng/mL (0.00-0.029)   22  12:34

## 2022-01-09 NOTE — CONSULTATION
History of Present Illness


Consult date: 01/09/22


Requesting physician: BEHZAD LEE


Reason for consult: other (Sepsis)


History of present illness: 





PCCM CONSULT NOTE (Full note dictated)





Please see dictated notes for full details





Medications and Allergies


                                    Allergies











Allergy/AdvReac Type Severity Reaction Status Date / Time


 


No Known Allergies Allergy   Unverified 01/08/22 05:34











Active Meds: 


Active Medications





Acetaminophen (Acetaminophen 325 Mg Tab)  650 mg PO Q4H PRN


   PRN Reason: Pain MILD(1-3)/Fever >100.5/HA


Famotidine (Famotidine 20 Mg/2 Ml Inj)  20 mg IV BID [facility]


   Last Admin: 01/09/22 13:00 Dose:  20 mg


   


Sodium Chloride (Nacl 0.9% 1000 Ml)  1,000 mls @ 75 mls/hr IV AS DIRECT [facility]


   Last Admin: 01/09/22 12:56 Dose:  75 mls/hr


   


NORepinephrine/NS 8 MG-250 ML (Norepinephrine/Ns 8 Mg-250 Ml (Double Conc))  8 

mg in 250 mls @ 3.75 mls/hr IV TITRATE [facility]; Protocol


   Last Titration: 01/09/22 06:25 Dose:  Infused


   


Ketorolac Tromethamine (Ketorolac 30 Mg/1 Ml Inj)  15 mg IV Q6HR [facility]


   Stop: 01/14/22 00:59


   Last Admin: 01/09/22 13:13 Dose:  15 mg


   


Metoclopramide HCl (Metoclopramide 10 Mg/2 Ml Inj)  10 mg IV Q6H PRN


   PRN Reason: Nausea And Vomiting


Ondansetron HCl (Ondansetron 4 Mg/2 Ml Inj)  4 mg IV Q8H PRN


   PRN Reason: Nausea And Vomiting


Oxycodone/Acetaminophen (Oxycodone /Acetaminophen 5-325mg Tab)  1 tab PO Q6H PRN


   PRN Reason: Pain, Moderate (4-6)


Sodium Chloride (Sodium Chloride 0.9% 10 Ml Flush Syringe)  10 ml IV BID [facility]


   Last Admin: 01/09/22 12:55 Dose:  10 ml


   


Sodium Chloride (Sodium Chloride 0.9% 10 Ml Flush Syringe)  10 ml IV PRN PRN


   PRN Reason: LINE FLUSH











Physical Examination


Vital signs: 


                                   Vital Signs











Temp Pulse Resp BP Pulse Ox


 


 98.7 F   83   18   116/71   100 


 


 01/08/22 05:38  01/08/22 05:38  01/08/22 05:38  01/08/22 05:38  01/08/22 05:38














Results





- Laboratory Findings


CBC and BMP: 


                                 01/09/22 04:58





                                 01/09/22 04:58


PT/INR, D-dimer











PT  21.0 Sec. (12.2-14.9)  H  01/08/22  10:08    


 


INR  1.64  (0.87-1.13)  H  01/08/22  10:08    








Abnormal lab findings: 


                                  Abnormal Labs











  01/08/22 01/08/22 01/08/22





  10:08 10:08 10:08


 


WBC   


 


Hgb  17.6 H  


 


Hct  53.0 H  


 


MCV  107 H  


 


MCH  35 H  


 


RDW  15.7 H  


 


Plt Count  82 L  


 


Seg Neuts % (Manual)  92.0 H  


 


Lymphocytes % (Manual)  4.0 L  


 


Seg Neutrophils # Man   


 


Lymphocytes # (Manual)  0.3 L  


 


PT   21.0 H 


 


INR   1.64 H 


 


Sodium   


 


Potassium   


 


Chloride   


 


BUN   


 


Glucose   


 


Calcium   


 


Magnesium   


 


Total Bilirubin   


 


Direct Bilirubin   


 


AST   


 


ALT   


 


Alkaline Phosphatase   


 


NT-Pro-B Natriuret Pep    2571 H


 


Albumin   


 


Salicylates   


 


Acetaminophen   














  01/08/22 01/08/22 01/08/22





  10:10 12:34 12:34


 


WBC   


 


Hgb   


 


Hct   


 


MCV   


 


MCH   


 


RDW   


 


Plt Count   


 


Seg Neuts % (Manual)   


 


Lymphocytes % (Manual)   


 


Seg Neutrophils # Man   


 


Lymphocytes # (Manual)   


 


PT   


 


INR   


 


Sodium  112 L*  


 


Potassium   


 


Chloride  77.6 L  


 


BUN   


 


Glucose   


 


Calcium  8.3 L  


 


Magnesium  1.60 L  


 


Total Bilirubin  8.90 H  


 


Direct Bilirubin  3.5 H  


 


AST  118 H  


 


ALT  58 H  


 


Alkaline Phosphatase  359 H  


 


NT-Pro-B Natriuret Pep   


 


Albumin  2.9 L  


 


Salicylates   < 0.3 L 


 


Acetaminophen    5.0 L














  01/09/22 01/09/22





  04:58 04:58


 


WBC  11.9 H 


 


Hgb  16.8 H 


 


Hct  50.2 H 


 


MCV  107 H 


 


MCH  36 H 


 


RDW  15.9 H 


 


Plt Count  94 L 


 


Seg Neuts % (Manual)  94.0 H 


 


Lymphocytes % (Manual)  5.0 L 


 


Seg Neutrophils # Man  11.2 H 


 


Lymphocytes # (Manual)  0.6 L 


 


PT  


 


INR  


 


Sodium   116 L*


 


Potassium   5.7 H


 


Chloride   81.4 L


 


BUN   21 H


 


Glucose   55 L


 


Calcium   8.3 L


 


Magnesium  


 


Total Bilirubin   9.80 H


 


Direct Bilirubin  


 


AST   129 H


 


ALT   60 H


 


Alkaline Phosphatase   303 H


 


NT-Pro-B Natriuret Pep  


 


Albumin   2.5 L


 


Salicylates  


 


Acetaminophen

## 2022-01-09 NOTE — HISTORY AND PHYSICAL REPORT
History of Present Illness


Date of examination: 22


Date of admission: 


22 17:40





Chief complaint: 


shortness of breath for 5 days


History of present illness: 





46-year-old male with history of CAD and liver failure in the past of unclear 

etiology and chronicity presents complaining of proximately 5 days of 

progressive dyspnea on exertion as well as lower extremity edema.  Patient is 

unable to identify the cause or the amount of time that he has had liver failure

but says this has been an ongoing problem and he has developed ascites in the 

past, however, the last time he needed a paracentesis was approximately 4 years 

ago.  He also says he has had subjective fever, mild dry cough, and intermittent

chest pain which she is unable to describe.  There are no known aggravating or 

alleviating factors.  He has not tried to take anything for his symptoms. he has

been taking his diuretics as prescribed. Denies any associated headache, vision 

change, neck pain, back pain, abdominal pain, nausea/vomiting, focal weakness, 

sensory changes, room spinning dizziness, dysuria, or any other complaints.  He 

is fully vaccinated against COVID-19.  He denies alcohol use.

















- Past Medical History


Previous Medical History?: Yes


Hx Heart Attack/AMI: Yes


Hx Liver Disease: Yes





- Social History


Smoking Status: Never Smoker


Substance Use Type: None








 Review of Systems


ROS: 


Stated complaint: INSOMNIA X 3 NIGHTS


Other details as noted in HPI





Comment: All other systems reviewed and negative


Constitutional: fever, malaise.  denies: chills


Eyes: denies: eye pain, vision change


ENT: denies: throat pain, congestion


Respiratory: cough, shortness of breath


Cardiovascular: chest pain, edema.  denies: palpitations, syncope


Gastrointestinal: denies: abdominal pain, nausea, vomiting


Genitourinary: denies: dysuria, frequency


Musculoskeletal: denies: back pain, arthralgia


Skin: denies: rash, lesions


Neurological: denies: headache, weakness, numbness








Medications and Allergies


                                    Allergies











Allergy/AdvReac Type Severity Reaction Status Date / Time


 


No Known Allergies Allergy   Unverified 22 05:34











Active Meds: 


Active Medications





Sodium Chloride (Nacl 0.9% 1000 Ml)  1,000 mls @ 125 mls/hr IV AS DIRECT GERMANIA


NORepinephrine/NS 8 MG-250 ML (Norepinephrine/Ns 8 Mg-250 Ml (Double Conc))  8 

mg in 250 mls @ 3.75 mls/hr IV TITRATE GERMANIA; Protocol


   Last Titration: 22 23:36 Dose:  6 mcg/min, 11.25 mls/hr


   











Exam





- Constitutional


Vitals: 


                                        











Temp Pulse Resp BP Pulse Ox


 


 97.8 F   86   21   89/55   96 


 


 22 20:31  22 22:46  22 22:46  22 22:46  22 22:46














HEART Score





- HEART Score


Troponin: 


                                        











Troponin T  < 0.010 ng/mL (0.00-0.029)   22  12:34    














Results





- Labs


CBC & Chem 7: 


                                 22 04:58





                                 22 04:58


Labs: 


                             Laboratory Last Values











WBC  8.1 K/mm3 (4.5-11.0)   22  10:08    


 


RBC  4.96 M/mm3 (3.65-5.03)   22  10:08    


 


Hgb  17.6 gm/dl (11.8-15.2)  H  22  10:08    


 


Hct  53.0 % (35.5-45.6)  H  22  10:08    


 


MCV  107 fl (84-94)  H  22  10:08    


 


MCH  35 pg (28-32)  H  22  10:08    


 


MCHC  33 % (32-34)   22  10:08    


 


RDW  15.7 % (13.2-15.2)  H  22  10:08    


 


Plt Count  82 K/mm3 (140-440)  L  22  10:08    


 


Add Manual Diff  Complete   22  10:08    


 


Total Counted  100   22  10:08    


 


Seg Neuts % (Manual)  92.0 % (40.0-70.0)  H  22  10:08    


 


Band Neutrophils %  0 %  22  10:08    


 


Lymphocytes % (Manual)  4.0 % (13.4-35.0)  L  22  10:08    


 


Reactive Lymphs % (Man)  1.0 %  22  10:08    


 


Monocytes % (Manual)  3.0 % (0.0-7.3)   22  10:08    


 


Eosinophils % (Manual)  0 % (0.0-4.3)   22  10:08    


 


Basophils % (Manual)  0 % (0.0-1.8)   22  10:08    


 


Metamyelocytes %  0 %  22  10:08    


 


Myelocytes %  0 %  22  10:08    


 


Promyelocytes %  0 %  22  10:08    


 


Blast Cells %  0 %  22  10:08    


 


Nucleated RBC %  Not Reportable   22  10:08    


 


Seg Neutrophils # Man  7.5 K/mm3 (1.8-7.7)   22  10:08    


 


Band Neutrophils #  0.0 K/mm3  22  10:08    


 


Lymphocytes # (Manual)  0.3 K/mm3 (1.2-5.4)  L  22  10:08    


 


Abs React Lymphs (Man)  0.1 K/mm3  22  10:08    


 


Monocytes # (Manual)  0.2 K/mm3 (0.0-0.8)   22  10:08    


 


Eosinophils # (Manual)  0.0 K/mm3 (0.0-0.4)   22  10:08    


 


Basophils # (Manual)  0.0 K/mm3 (0.0-0.1)   22  10:08    


 


Metamyelocytes #  0.0 K/mm3  22  10:08    


 


Myelocytes #  0.0 K/mm3  22  10:08    


 


Promyelocytes #  0.0 K/mm3  22  10:08    


 


Blast Cells #  0.0 K/mm3  22  10:08    


 


WBC Morphology  Not Reportable   22  10:08    


 


WBC Morphology  TNR   22  10:08    


 


Hypersegmented Neuts  Not Reportable   22  10:08    


 


Hyposegmented Neuts  Not Reportable   22  10:08    


 


Hypogranular Neuts  Not Reportable   22  10:08    


 


Smudge Cells  Not Reportable   22  10:08    


 


Toxic Granulation  Not Reportable   22  10:08    


 


Toxic Vacuolation  Not Reportable   22  10:08    


 


Dohle Bodies  Not Reportable   22  10:08    


 


Pelger-Huet Anomaly  Not Reportable   22  10:08    


 


Scar Rods  Not Reportable   22  10:08    


 


Platelet Estimate  Consistent w auto   22  10:08    


 


Clumped Platelets  Not Reportable   22  10:08    


 


Plt Clumps, EDTA  Not Reportable   22  10:08    


 


Large Platelets  Not Reportable   22  10:08    


 


Giant Platelets  Few   22  10:08    


 


Platelet Satelliting  Not Reportable   22  10:08    


 


Plt Morphology Comment  Not Reportable   22  10:08    


 


RBC Morphology  Not Reportable   22  10:08    


 


Dimorphic RBCs  Not Reportable   22  10:08    


 


Polychromasia  Not Reportable   22  10:08    


 


Hypochromasia  Not Reportable   22  10:08    


 


Poikilocytosis  Not Reportable   22  10:08    


 


Anisocytosis  Not Reportable   22  10:08    


 


Microcytosis  Not Reportable   22  10:08    


 


Macrocytosis  Not Reportable   22  10:08    


 


Spherocytes  Not Reportable   22  10:08    


 


Pappenheimer Bodies  Not Reportable   22  10:08    


 


Sickle Cells  Not Reportable   22  10:08    


 


Target Cells  Few   22  10:08    


 


Tear Drop Cells  Not Reportable   22  10:08    


 


Ovalocytes  Not Reportable   22  10:08    


 


Helmet Cells  Not Reportable   22  10:08    


 


Leon-Colma Bodies  Not Reportable   22  10:08    


 


Cabot Rings  Not Reportable   22  10:08    


 


Lutsen Cells  Not Reportable   22  10:08    


 


Bite Cells  Not Reportable   22  10:08    


 


Crenated Cell  Not Reportable   22  10:08    


 


Elliptocytes  Not Reportable   22  10:08    


 


Acanthocytes (Spur)  Not Reportable   22  10:08    


 


Rouleaux  Not Reportable   22  10:08    


 


Hemoglobin C Crystals  Not Reportable   22  10:08    


 


Schistocytes  Not Reportable   22  10:08    


 


Malaria parasites  Not Reportable   22  10:08    


 


Arnie Bodies  Not Reportable   22  10:08    


 


Hem Pathologist Commnt  No   22  10:08    


 


PT  21.0 Sec. (12.2-14.9)  H  22  10:08    


 


INR  1.64  (0.87-1.13)  H  22  10:08    


 


APTT  35.7 Sec. (24.2-36.6)   22  10:08    


 


Sodium  112 mmol/L (137-145)  L*  22  10:10    


 


Potassium  5.0 mmol/L (3.6-5.0)   22  10:10    


 


Chloride  77.6 mmol/L ()  L  22  10:10    


 


Carbon Dioxide  26 mmol/L (22-30)   22  10:10    


 


Anion Gap  13 mmol/L  22  10:10    


 


BUN  16 mg/dL (9-20)   22  10:10    


 


Creatinine  0.8 mg/dL (0.8-1.3)   22  10:10    


 


Estimated GFR  > 60 ml/min  22  10:10    


 


BUN/Creatinine Ratio  20 %  22  10:10    


 


Glucose  93 mg/dL ()   22  10:10    


 


Calcium  8.3 mg/dL (8.4-10.2)  L  22  10:10    


 


Magnesium  1.60 mg/dL (1.7-2.3)  L  22  10:10    


 


Total Bilirubin  8.90 mg/dL (0.1-1.2)  H  22  10:10    


 


Direct Bilirubin  3.5 mg/dL (0-0.2)  H  22  10:10    


 


Indirect Bilirubin  5.4 mg/dL  22  10:10    


 


AST  118 units/L (5-40)  H  22  10:10    


 


ALT  58 units/L (7-56)  H  22  10:10    


 


Alkaline Phosphatase  359 units/L ()  H  22  10:10    


 


Ammonia  40.0 umol/L (25-60)   22  12:34    


 


Troponin T  < 0.010 ng/mL (0.00-0.029)   22  12:34    


 


NT-Pro-B Natriuret Pep  2571 pg/mL (0-450)  H  22  10:08    


 


Total Protein  6.9 g/dL (6.3-8.2)   22  10:10    


 


Albumin  2.9 g/dL (3.9-5)  L  22  10:10    


 


Albumin/Globulin Ratio  0.7 %  22  10:10    


 


Lipase  13 units/L (13-60)   22  10:08    


 


Salicylates  < 0.3 mg/dL (2.8-20.0)  L  22  12:34    


 


Acetaminophen  5.0 ug/mL (10.0-30.0)  L  22  12:34    


 


Plasma/Serum Alcohol  < 0.01 % (0-0.07)   22  12:34    











Microbiology: 


Microbiology





22 12:34   Peripheral/Venous   Blood Culture - Preliminary


                            Culture in Progress


22 12:34   Peripheral/Venous   Blood Culture - Preliminary


                            Culture in Progress











- Imaging and Cardiology


Imaging and Cardiology: 





Abdominal CAT scan


Cardiomegaly and pulmonary venous hypertension


Mild emphysema


Mild biliary ductal dilatation





Abdominal ultrasound


Moderate diffuse nonspecific hepatocellular disease without focal abnormality


No evidence of gallstones.  Suboptimal visualization of the pancreas 

sonographically





Chest CT


Cardiomegaly and pulmonary venous hypertension


Mild emphysema


Mild biliary duct dilation





Head CT


Periventricular and deep white matter lucency.  This is considered an abnormal 

finding in this 46-year-old individual.  Please refer to the above discussion.  

No additional abnormality on head CT without consent.





Assessment and Plan


Assessment and plan: 


Critical care statement


The high probability OF a clinically significant sudden or life-threatening 

deterioration of the cardiorespiratory system and endocrine system required my 

full and direct attention, intervention and postoperative management.  The 

aggregate medical care time was 40 minutes.  The time is in addition to time 

spent performing reported procedures but includes the followin: Data review and interpretation


2: Patient assessment and monitoring of vital signs


3: Documentation


4:: Medication orders and management


Advance Directives: Yes (Full code)


VTE prophylaxis?: Chemical


Plan of care discussed with patient/family: Yes





- Patient Problems


(1) Hyponatremia


Current Visit: Yes   Status: Acute   


Plan to address problem: 


Check urine osmolarity and serum osmolarity








(2) Liver failure


Current Visit: Yes   Status: Chronic   


Qualifiers: 


   Liver failure chronicity: chronic   Hepatic coma status: without hepatic coma

  Qualified Code(s): K72.10 - Chronic hepatic failure without coma   


Plan to address problem: 


Patient has end-stage lung disease


Elevated total bili of 8.9 and direct bili of 3.5, AST is 118 and ALT is 58








(3) Hypotension


Current Visit: Yes   Status: Acute   


Qualifiers: 


   Hypotension type: unspecified hypotension type   Qualified Code(s): I95.9 - 

Hypotension, unspecified   


Plan to address problem: 


Patient is on Levophed at 20 mics 








(4) Cirrhosis of liver


Current Visit: Yes   Status: Chronic   


Qualifiers: 


   Ascites presence: without ascites 


Plan to address problem: 


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


Patient is a poor historian


Says he has no family











(5) Hypomagnesemia


Current Visit: Yes   Status: Acute   


Plan to address problem: 


Supplemented with IV magnesium








(6) Polycythemia due to fall in plasma volume


Current Visit: Yes   Status: Acute   


Plan to address problem: 


Gentle hydration given the elevated BNP








(7) Malnutrition


Current Visit: Yes   Status: Chronic   


Qualifiers: 


   Protein-calorie malnutrition severity: moderate 


Plan to address problem: 


Dietary supplements requested








(8) Elevated brain natriuretic peptide (BNP) level


Current Visit: Yes   Status: Acute   


Plan to address problem: 


Echocardiogram for ejection fraction and valve function








(9) DVT prophylaxis


Current Visit: Yes   Status: Acute   


Plan to address problem: 


SCDs and GI prophylaxis

## 2022-01-09 NOTE — CONSULTATION
DATE OF CONSULTATION: 01/09/2022



CONSULTING PHYSICIAN:  Dr. Bansal.



REASON FOR CONSULTATION:  Hypotension, end-stage liver disease.



CHIEF COMPLAINT AND HISTORY OF PRESENT ILLNESS:  The patient is a now 

46-year-old obese male with past medical history significant for a diagnosis of 

chronic liver disease of unclear etiology and chronicity, came into the 

Emergency Room complaining of 5 days of progressive dyspnea on exertion, lower 

extremity edema.  He did not give a good history as to how long he had liver 

failure.  He did mention he has developed ascites in the past, but the last time

he needed a paracentesis was about 4 years ago.  He complained of a subjective 

fever; dry, nonproductive cough; intermittent chest pain.  He stated that he was

on diuretics and had been taking his diuretics as prescribed.  He denied any new

onset of focal weakness.  He denied dizziness.  He denied dysuria.  He denied 

any contact with anyone with known coronavirus-19 infection and mentioned that 

he was fully vaccinated against COVID-19.  He also denied current alcohol use.  

When I stopped by to admit him, however, he was not as cooperative.  He was more

concerned about getting some water to drink for his meal.  He did deny any chest

pain.  He denied any hemoptysis.  He denied any hematochezia or melena and 

denied any other bleeding diathesis.  This really is as much of the history of 

presentation as I have except to add he described himself as a never smoker.



PAST MEDICAL HISTORY:  Again, significant for coronary artery disease, liver 

failure.  He is obese.



PAST SURGICAL HISTORY:  Unknown.



MEDICATIONS:  He was on at the time I stopped by to see him, according to the 

medication administration record included the following:  Tylenol 650 mg p.o. q.

4 hours p.r.n. mild pain or fevers, Pepcid 20 mg IV b.i.d., Toradol 15 mg IV q. 

6 hours scheduled, Reglan 10 mg IV q. 6 hours p.r.n. nausea and vomiting, 

Levophed drip had earlier been started at 2 mcg per minute, Zofran 4 mg IV q. 8 

hours p.r.n. nausea and vomiting, Percocet 1 tablet p.o. q. 6 hours p.r.n. 

moderate pain that is the 5/325 mg tablet.



ALLERGIES:  No known drug allergies.



DIET:  Obese gentleman, acute weight loss or gain history is unknown.



FAMILY AND SOCIAL HISTORY:  Apparently lives in the community.  Denied alcohol, 

tobacco, illicit drug use or abuse currently. Remote history is unknown.  Family

history is otherwise unknown.



REVIEW OF SYSTEMS:  Obtained.  He is not the best historian.  It is as in the 

body of the history above.  Otherwise, I will add no witnessed seizures since he

has been here.



PHYSICAL EXAMINATION:

VITAL SIGNS:  At presentation in the Emergency Room, afebrile, temperature 98.7 

degrees Fahrenheit, pulse of 83, respiratory rate of 18, blood pressure 116/71, 

O2 sats 100%. Inspired oxygen concentration at that time was not recorded.  When

I stopped by to see him, his O2 sats were 98% on room air.

GENERAL:  Again, he is a middle-aged, morbidly obese male.  Normocephalic, 

atraumatic.  Talking to me in slow, but uninterrupted sentences with normal 

respiratory effort at rest.

HEAD, EYES, EARS, NOSE AND THROAT:  He had mild scleral icterus.  No erythema.

NECK:  Grossly, there were no palpable lymph nodes in the supraclavicular or 

submandibular lymph node chains.  No thyromegaly.  No gross jugular venous 

distention.  The neck was supple.

LUNGS:  Auscultation of both lung fields was unremarkable.  He had good 

bilateral air movement, slightly diminished; however, no wheezing.

HEART:  Sounds 1 and 2 are heard at the time of my evaluation, regular rate and 

rhythm without overt rubs or murmurs.

ABDOMEN:  Soft, full, protuberant.  Bowel sounds are positive, but hypoactive. 

Mildly tender over the epigastrium.  No palpable hepatosplenomegaly.

EXTREMITIES:  Without overt digital clubbing or cyanosis.  He would not let me 

palpate his lower extremities, complaining of pain.  There was some mild 

erythema to both legs bilaterally.

NEUROLOGIC:  Pupils were equal, round, about 4 mm, reactive to light.  

Extraocular muscle movements were intact.  He moved all 4 extremities 

spontaneously.

SKIN:  Normal turgor in the areas I examined without overt cellulitis or rash.  

He did have mild erythema to both lower extremities.  Please see the wound care 

nurses' notes for full description of his skin.

PSYCHIATRIC:  Mood and affect were flat, somewhat depressed.  He seems to have 

intact judgment and insight.



LABORATORY DATA:  From my review is as follows:  White cell count 8100, 

hemoglobin 17.6, hematocrit 53.0, platelet count was 82.  No band forms on the 

manual differential.  INR 1.64.  Serum sodium was 112, potassium 5.0, chloride 

was 78, bicarbonate was 26, BUN 16, creatinine 0.8, and glucose was 93. Total 

bilirubin was 8.9, direct 3.5. AST was up at 118, ALT 58.  Ammonia level was 

within normal limits.  Troponin within normal limits.  BNP was elevated at 2571.

 Lipase 13.  Aspirin, Tylenol, alcohol levels were nondetectable.  His serum 

sodium is up to 116 today, potassium 5.7, BUN 21, creatinine 0.8. Platelet count

is up to 94.  Two sets of blood cultures are no growth to date.  Chest x-ray; 

gross cardiomegaly and large right main pulmonary artery trunk, no gross 

pneumothorax, mild interstitial edema, possible small pleural effusions. Right 

upper quadrant ultrasound shows moderate diffuse nonspecific hepatocellular 

disease without focal abnormality.  A CT scan of the head was done.  

Periventricular and deep white matter lucency considered abnormal finding in the

patient of his age.  Otherwise, no acute abnormality.  A CT scan was done of his

chest. A, I believe, good contrast filling of the pulmonary arteries.  I do not 

see any gross filling defects consistent with large order pulmonary emboli. 

Motion artifact interferes evaluation.  He does have some ground glass 

opacifications bilaterally and the upper lung zones demonstrate evidence of 

emphysema, in particular, the right upper lobe greater than the left upper lobe.

 Again, no gross pneumothorax, no gross bony fracture.



ASSESSMENT:

1.  End-stage liver disease, decompensated.

2.  Severe hyponatremia.

3.  Hypotension at presentation.

4.  Possible liver cirrhosis.

5.  Acute exacerbation of congestive heart failure.

6.  Morbid obesity.

7.  History of coronary artery disease.

8.  Hemoconcentration.

9.  Thrombocytopenia.

10.  Hypomagnesemia.

11.  Elevated serum transaminases.

12. Hyperbilirubinemia.

13.  Hyperkalemia.



PLAN:  From respiratory standpoint, he is doing well.  He is on room air. 

Supplemental oxygen will be offered as necessary to keep sats greater than or 

equal to about 90%.  Aspiration precautions will be maintained including 

antiemetics to ensure that he does not have episodes of vomiting with 

inadvertent aspiration.  His blood pressure is getting better.  Gentle 

hydration.  Levophed will be weaned to keep mean arterial pressures greater than

or equal to about 65 mmHg.  I note the developing acute kidney injury.  In 

particular, I note the hyperkalemia.  Nephrology evaluation may be of benefit.  

I will order for 30 grams of Kayexalate in the short time.  GI consultation has 

been placed.  Continued alcohol abstinence and tobacco abstinence has been 

counseled.  He is appropriately on GI prophylaxis. DVT prophylaxis will be 

offered in the form of SCDs.  Flu and pneumonia vaccination will be addressed 

per protocol.



Thank you very much for the consult.  We will follow along and make further 

recommendations as picture progresses/becomes clearer.  He is critically ill, on

life-sustaining interventions including vasopressors, at very high risk of death

from cardiopulmonary and renal system decompensation.  A 2D echocardiogram may 

be of benefit.  Neurology evaluation may be of benefit with regards to the 

abnormal neuro imaging findings.  Flu and pneumonia vaccination will be 

addressed per protocol.



Thank you very much for the consult.  We will follow along with further 

recommendations as picture progresses/becomes clearer.  He is critically ill on 

life-sustaining interventions including the Levophed drip, at high risk of death

from cardiopulmonary and GI system decompensation.  At this time, I spent about 

35-40 minutes of critical care time without overlap and excluding any procedural

time that may be necessary.







DD: 01/09/2022 05:09 PM

DT: 01/09/2022 10:59 PM

TID: [id number] RECEIPT: [id number]

AMANDA/MARY

## 2022-01-10 LAB
ALBUMIN SERPL-MCNC: 2.2 G/DL (ref 3.9–5)
ALT SERPL-CCNC: 51 UNITS/L (ref 7–56)
BAND NEUTROPHILS # (MANUAL): 0 K/MM3
BASOPHILS NFR BLD AUTO: 0.4 % (ref 0–1.8)
BUN SERPL-MCNC: 30 MG/DL (ref 9–20)
BUN/CREAT SERPL: 23 %
CALCIUM SERPL-MCNC: 8 MG/DL (ref 8.4–10.2)
EOSINOPHIL NFR BLD AUTO: 1 % (ref 0–4.3)
HCT VFR BLD CALC: 45.4 % (ref 35.5–45.6)
HCT VFR BLD CALC: 46.9 % (ref 35.5–45.6)
HEMOLYSIS INDEX: 32
HGB BLD-MCNC: 15.1 GM/DL (ref 11.8–15.2)
HGB BLD-MCNC: 15.6 GM/DL (ref 11.8–15.2)
LYMPHOCYTES NFR BLD AUTO: 15.5 % (ref 13.4–35)
MCHC RBC AUTO-ENTMCNC: 33 % (ref 32–34)
MCHC RBC AUTO-ENTMCNC: 33 % (ref 32–34)
MCV RBC AUTO: 108 FL (ref 84–94)
MCV RBC AUTO: 109 FL (ref 84–94)
MONOCYTES % (AUTO): 13.6 % (ref 0–7.3)
MYELOCYTES # (MANUAL): 0.1 K/MM3
PLATELET # BLD: 66 K/MM3 (ref 140–440)
PLATELET # BLD: 67 K/MM3 (ref 140–440)
PROMYELOCYTES # (MANUAL): 0 K/MM3
RBC # BLD AUTO: 4.2 M/MM3 (ref 3.65–5.03)
RBC # BLD AUTO: 4.3 M/MM3 (ref 3.65–5.03)
TOTAL CELLS COUNTED BLD: 100

## 2022-01-10 RX ADMIN — KETOROLAC TROMETHAMINE SCH: 30 INJECTION, SOLUTION INTRAMUSCULAR at 05:55

## 2022-01-10 RX ADMIN — Medication SCH: at 10:14

## 2022-01-10 RX ADMIN — FAMOTIDINE SCH: 10 INJECTION, SOLUTION INTRAVENOUS at 10:14

## 2022-01-10 RX ADMIN — KETOROLAC TROMETHAMINE SCH: 30 INJECTION, SOLUTION INTRAMUSCULAR at 20:57

## 2022-01-10 RX ADMIN — KETOROLAC TROMETHAMINE SCH: 30 INJECTION, SOLUTION INTRAMUSCULAR at 12:22

## 2022-01-10 RX ADMIN — KETOROLAC TROMETHAMINE SCH: 30 INJECTION, SOLUTION INTRAMUSCULAR at 02:06

## 2022-01-10 NOTE — GASTROENTEROLOGY PROGRESS NOTE
Assessment and Plan


1. 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 

disease


- pt w/ ascites but no need for paracenthesis


- pt ammonia stable,


- diet as tolerated 


- Na management per primary team


- watch for signs possible alcohol withdrawal


- no changes at this time


- if stable in am ok to dc from GI standpoint


-will follow











Subjective


Date of service: 01/10/22


Principal diagnosis: Hyponatremia,Liver failure


Interval history: 


- pt without specific GI complaints overnight.








Objective





- Constitutional


Vitals: 


                                        











Temp Pulse Resp BP Pulse Ox


 


 97.1 F L  83   12   98/67   96 


 


 01/10/22 04:00  01/10/22 07:01  01/10/22 07:01  01/10/22 07:01  01/10/22 07:01











General appearance: no acute distress





- EENT


Eyes: PERRL





- Respiratory


Respiratory: bilateral: CTA





- Cardiovascular


Rhythm: regular


Heart Sounds: Present: S1 & S2





- Gastrointestinal


General gastrointestinal: Present: soft, non-tender, non-distended





- Labs


CBC & Chem 7: 


                                 01/10/22 05:20





                                 01/10/22 05:20


Labs: 


                         Laboratory Results - last 24 hr











  01/09/22 01/09/22 01/09/22





  19:00 19:50 19:50


 


WBC   


 


RBC   


 


Hgb   


 


Hct   


 


MCV   


 


MCH   


 


MCHC   


 


RDW   


 


Plt Count   


 


Lymph % (Auto)   


 


Mono % (Auto)   


 


Eos % (Auto)   


 


Baso % (Auto)   


 


Add Manual Diff   


 


Total Counted   


 


Seg Neutrophils %   


 


Seg Neuts % (Manual)   


 


Band Neutrophils %   


 


Lymphocytes % (Manual)   


 


Reactive Lymphs % (Man)   


 


Monocytes % (Manual)   


 


Eosinophils % (Manual)   


 


Basophils % (Manual)   


 


Metamyelocytes %   


 


Myelocytes %   


 


Promyelocytes %   


 


Blast Cells %   


 


Nucleated RBC %   


 


Seg Neutrophils # Man   


 


Band Neutrophils #   


 


Lymphocytes # (Manual)   


 


Abs React Lymphs (Man)   


 


Monocytes # (Manual)   


 


Eosinophils # (Manual)   


 


Basophils # (Manual)   


 


Metamyelocytes #   


 


Myelocytes #   


 


Promyelocytes #   


 


Blast Cells #   


 


WBC Morphology   


 


Hypersegmented Neuts   


 


Hyposegmented Neuts   


 


Hypogranular Neuts   


 


Smudge Cells   


 


Toxic Granulation   


 


Toxic Vacuolation   


 


Dohle Bodies   


 


Pelger-Huet Anomaly   


 


Scar Rods   


 


Platelet Estimate   


 


Clumped Platelets   


 


Plt Clumps, EDTA   


 


Large Platelets   


 


Giant Platelets   


 


Platelet Satelliting   


 


Plt Morphology Comment   


 


RBC Morphology   


 


Dimorphic RBCs   


 


Polychromasia   


 


Hypochromasia   


 


Poikilocytosis   


 


Anisocytosis   


 


Microcytosis   


 


Macrocytosis   


 


Spherocytes   


 


Pappenheimer Bodies   


 


Sickle Cells   


 


Target Cells   


 


Tear Drop Cells   


 


Ovalocytes   


 


Helmet Cells   


 


[place] Bodies   


 


Cabot Rings   


 


Swati Cells   


 


Bite Cells   


 


Crenated Cell   


 


Elliptocytes   


 


Acanthocytes (Spur)   


 


Rouleaux   


 


Hemoglobin C Crystals   


 


Schistocytes   


 


Malaria parasites   


 


Arnie Bodies   


 


Hem Pathologist Commnt   


 


Sodium   


 


Potassium   


 


Chloride   


 


Carbon Dioxide   


 


Anion Gap   


 


BUN   


 


Creatinine   


 


Estimated GFR   


 


BUN/Creatinine Ratio   


 


Glucose   


 


Lactic Acid   


 


Calcium   


 


Total Bilirubin   


 


AST   


 


ALT   


 


Alkaline Phosphatase   


 


C-Reactive Protein   0.80 


 


Total Protein   


 


Albumin   


 


Albumin/Globulin Ratio   


 


Procalcitonin    0.28


 


Urine Opiates Screen  Negative  


 


Urine Methadone Screen  Negative  


 


Ur Barbiturates Screen  Negative  


 


Ur Phencyclidine Scrn  Negative  


 


Ur Amphetamines Screen  Negative  


 


U Benzodiazepines Scrn  Negative  


 


Urine Cocaine Screen  Negative  


 


U Marijuana (THC) Screen  Negative  


 


Drugs of Abuse Note  Disclamer  














  01/09/22 01/09/22 01/09/22





  19:50 22:21 22:21


 


WBC   9.5 


 


RBC   4.30 


 


Hgb   15.6 H 


 


Hct   46.9 H 


 


MCV   109 H 


 


MCH   36 H 


 


MCHC   33 


 


RDW   16.1 H 


 


Plt Count   66 L 


 


Lymph % (Auto)   15.5 


 


Mono % (Auto)   13.6 H 


 


Eos % (Auto)   1.0 


 


Baso % (Auto)   0.4 


 


Add Manual Diff   


 


Total Counted   


 


Seg Neutrophils %   69.5 


 


Seg Neuts % (Manual)   


 


Band Neutrophils %   


 


Lymphocytes % (Manual)   


 


Reactive Lymphs % (Man)   


 


Monocytes % (Manual)   


 


Eosinophils % (Manual)   


 


Basophils % (Manual)   


 


Metamyelocytes %   


 


Myelocytes %   


 


Promyelocytes %   


 


Blast Cells %   


 


Nucleated RBC %   


 


Seg Neutrophils # Man   


 


Band Neutrophils #   


 


Lymphocytes # (Manual)   


 


Abs React Lymphs (Man)   


 


Monocytes # (Manual)   


 


Eosinophils # (Manual)   


 


Basophils # (Manual)   


 


Metamyelocytes #   


 


Myelocytes #   


 


Promyelocytes #   


 


Blast Cells #   


 


WBC Morphology   


 


Hypersegmented Neuts   


 


Hyposegmented Neuts   


 


Hypogranular Neuts   


 


Smudge Cells   


 


Toxic Granulation   


 


Toxic Vacuolation   


 


Dohle Bodies   


 


Pelger-Huet Anomaly   


 


Scar Rods   


 


Platelet Estimate   


 


Clumped Platelets   


 


Plt Clumps, EDTA   


 


Large Platelets   


 


Giant Platelets   


 


Platelet Satelliting   


 


Plt Morphology Comment   


 


RBC Morphology   


 


Dimorphic RBCs   


 


Polychromasia   


 


Hypochromasia   


 


Poikilocytosis   


 


Anisocytosis   


 


Microcytosis   


 


Macrocytosis   


 


Spherocytes   


 


Pappenheimer Bodies   


 


Sickle Cells   


 


Target Cells   


 


Tear Drop Cells   


 


Ovalocytes   


 


Helmet Cells   


 


[place] Bodies   


 


Cabot Rings   


 


Gould Cells   


 


Bite Cells   


 


Crenated Cell   


 


Elliptocytes   


 


Acanthocytes (Spur)   


 


Rouleaux   


 


Hemoglobin C Crystals   


 


Schistocytes   


 


Malaria parasites   


 


Arnie Bodies   


 


Hem Pathologist Commnt   


 


Sodium    120 L


 


Potassium    5.6 H


 


Chloride    89.1 L


 


Carbon Dioxide    18 L


 


Anion Gap    19


 


BUN    28 H


 


Creatinine    0.9


 


Estimated GFR    > 60


 


BUN/Creatinine Ratio    31


 


Glucose    121 H


 


Lactic Acid  2.90 H*  


 


Calcium    7.5 L


 


Total Bilirubin    7.90 H


 


AST    115 H


 


ALT    48


 


Alkaline Phosphatase    304 H


 


C-Reactive Protein   


 


Total Protein    5.6 L


 


Albumin    2.1 L


 


Albumin/Globulin Ratio    0.6


 


Procalcitonin   


 


Urine Opiates Screen   


 


Urine Methadone Screen   


 


Ur Barbiturates Screen   


 


Ur Phencyclidine Scrn   


 


Ur Amphetamines Screen   


 


U Benzodiazepines Scrn   


 


Urine Cocaine Screen   


 


U Marijuana (THC) Screen   


 


Drugs of Abuse Note   














  01/09/22 01/10/22 01/10/22





  22:21 05:20 05:20


 


WBC   8.2 


 


RBC   4.20 


 


Hgb   15.1 


 


Hct   45.4 


 


MCV   108 H 


 


MCH   36 H 


 


MCHC   33 


 


RDW   16.0 H 


 


Plt Count   67 L 


 


Lymph % (Auto)   


 


Mono % (Auto)   


 


Eos % (Auto)   


 


Baso % (Auto)   


 


Add Manual Diff   Complete 


 


Total Counted   100 


 


Seg Neutrophils %   


 


Seg Neuts % (Manual)   82.0 H 


 


Band Neutrophils %   0 


 


Lymphocytes % (Manual)   12.0 L 


 


Reactive Lymphs % (Man)   2.0 


 


Monocytes % (Manual)   1.0 


 


Eosinophils % (Manual)   1.0 


 


Basophils % (Manual)   1.0 


 


Metamyelocytes %   0 


 


Myelocytes %   1.0 


 


Promyelocytes %   0 


 


Blast Cells %   0 


 


Nucleated RBC %   Not Reportable 


 


Seg Neutrophils # Man   6.7 


 


Band Neutrophils #   0.0 


 


Lymphocytes # (Manual)   1.0 L 


 


Abs React Lymphs (Man)   0.2 


 


Monocytes # (Manual)   0.1 


 


Eosinophils # (Manual)   0.1 


 


Basophils # (Manual)   0.1 


 


Metamyelocytes #   0.0 


 


Myelocytes #   0.1 


 


Promyelocytes #   0.0 


 


Blast Cells #   0.0 


 


WBC Morphology   Not Reportable 


 


Hypersegmented Neuts   Not Reportable 


 


Hyposegmented Neuts   Not Reportable 


 


Hypogranular Neuts   Not Reportable 


 


Smudge Cells   Not Reportable 


 


Toxic Granulation   Not Reportable 


 


Toxic Vacuolation   Not Reportable 


 


Dohle Bodies   Not Reportable 


 


Pelger-Huet Anomaly   Not Reportable 


 


Scar Rods   Not Reportable 


 


Platelet Estimate   Appears normal 


 


Clumped Platelets   Not Reportable 


 


Plt Clumps, EDTA   Not Reportable 


 


Large Platelets   Not Reportable 


 


Giant Platelets   Not Reportable 


 


Platelet Satelliting   Not Reportable 


 


Plt Morphology Comment   Not Reportable 


 


RBC Morphology   Not Reportable 


 


Dimorphic RBCs   Not Reportable 


 


Polychromasia   Not Reportable 


 


Hypochromasia   Not Reportable 


 


Poikilocytosis   Not Reportable 


 


Anisocytosis   Not Reportable 


 


Microcytosis   Not Reportable 


 


Macrocytosis   Not Reportable 


 


Spherocytes   Not Reportable 


 


Pappenheimer Bodies   Not Reportable 


 


Sickle Cells   Not Reportable 


 


Target Cells   Not Reportable 


 


Tear Drop Cells   Not Reportable 


 


Ovalocytes   Not Reportable 


 


Helmet Cells   Not Reportable 


 


[place] Bodies   Not Reportable 


 


Cabot Rings   Not Reportable 


 


Gould Cells   Not Reportable 


 


Bite Cells   Not Reportable 


 


Crenated Cell   Not Reportable 


 


Elliptocytes   Not Reportable 


 


Acanthocytes (Spur)   Not Reportable 


 


Rouleaux   Not Reportable 


 


Hemoglobin C Crystals   Not Reportable 


 


Schistocytes   Not Reportable 


 


Malaria parasites   Not Reportable 


 


Arnie Bodies   Not Reportable 


 


Hem Pathologist Commnt   No 


 


Sodium    124 L


 


Potassium    4.9


 


Chloride    90.8 L


 


Carbon Dioxide    23


 


Anion Gap    15


 


BUN    30 H


 


Creatinine    1.3


 


Estimated GFR    59


 


BUN/Creatinine Ratio    23


 


Glucose    93


 


Lactic Acid  1.40  


 


Calcium    8.0 L


 


Total Bilirubin    6.70 H


 


AST    97 H


 


ALT    51


 


Alkaline Phosphatase    318 H


 


C-Reactive Protein   


 


Total Protein    5.1 L


 


Albumin    2.2 L


 


Albumin/Globulin Ratio    0.8


 


Procalcitonin   


 


Urine Opiates Screen   


 


Urine Methadone Screen   


 


Ur Barbiturates Screen   


 


Ur Phencyclidine Scrn   


 


Ur Amphetamines Screen   


 


U Benzodiazepines Scrn   


 


Urine Cocaine Screen   


 


U Marijuana (THC) Screen   


 


Drugs of Abuse Note

## 2022-01-10 NOTE — PROGRESS NOTE
Assessment and Plan





46-year-old male with history of CAD and liver failure in the past of unclear 

etiology and chronicity presents complaining of proximately 5 days of 

progressive dyspnea on exertion as well as lower extremity edema.  Patient is 

unable to identify the cause or the amount of time that he has had liver failure

but says this has been an ongoing problem and he has developed ascites in the 

past, however, the last time he needed a paracentesis was approximately 4 years 

ago.  He also says he has had subjective fever, mild dry cough, and intermittent

chest pain which she is unable to describe.  There are no known aggravating or 

alleviating factors.  He has not tried to take anything for his symptoms. he has

been taking his diuretics as prescribed. Denies any associated headache, vision 

change, neck pain, back pain, abdominal pain, nausea/vomiting, focal weakness, 

sensory changes, room spinning dizziness, dysuria, or any other complaints.  He 

is fully vaccinated against COVID-19.  Patient has a history of smoking and 

alcohol use, stopped both of them several years ago. Worked with [employer].





Patient awake, resting on room air. Patient says he is feeling tired. O2 

saturation running 98%


Patient afebrile, no leukocytosis. /56 Pulse 71 Respirations 15





Chest xray on 01/08/22 reported Cardiomegaly without CHF


CT  scan of chest on 01/08/22 reported Cardiomegaly and pulmonary venous 

hypertension, Mild emphysema, Mild biliary ductal dilatation.   





Patient is on Famotidine and IV normal saline





I have seen the patient in the emergency room. 


I spent a critical care time of 37 minutes obtaining the history, reviewing c

hinojosa, examining patient, and reviewing chest xray, CT scan, and labs, talking to

the nursing staff, and work up plan of treatment for this critically-ill 

hypotensive patient. 





- Patient Problems


(1) Heart failure


Current Visit: Yes   Status: Acute   


Plan to address problem: 


Management as for primary care and cardiology








(2) Hypotension


Current Visit: Yes   Status: Acute   


Qualifiers: 


   Hypotension type: unspecified hypotension type   Qualified Code(s): I95.9 - 

Hypotension, unspecified   


Plan to address problem: 


Blood pressure improved. 101/56








(3) Metabolic encephalopathy


Current Visit: Yes   Status: Acute   


Plan to address problem: 


Management as for primary care








(4) Thrombocytopenia


Current Visit: Yes   Status: Acute   


Plan to address problem: 


Management as for primary care and hematology








(5) Liver failure


Current Visit: Yes   Status: Chronic   


Qualifiers: 


   Liver failure chronicity: chronic   Hepatic coma status: without hepatic coma

  Qualified Code(s): K72.10 - Chronic hepatic failure without coma   


Plan to address problem: 


Management as for gastroenterology 








(6) Malnutrition


Current Visit: Yes   Status: Chronic   


Qualifiers: 


   Protein-calorie malnutrition severity: moderate 


Plan to address problem: 


Consult dietician 








(7) Hyponatremia


Current Visit: Yes   Status: Acute   


Plan to address problem: 


Patient is on IV normal saline. Management as for nephrology








Subjective


Date of service: 01/10/22


Principal diagnosis: Hyponatremia,Liver failure


Interval history: 





46-year-old male with history of CAD and liver failure in the past of unclear 

etiology and chronicity presents complaining of proximately 5 days of 

progressive dyspnea on exertion as well as lower extremity edema.  Patient is 

unable to identify the cause or the amount of time that he has had liver failure

but says this has been an ongoing problem and he has developed ascites in the 

past, however, the last time he needed a paracentesis was approximately 4 years 

ago.  He also says he has had subjective fever, mild dry cough, and intermittent

chest pain which she is unable to describe.  There are no known aggravating or 

alleviating factors.  He has not tried to take anything for his symptoms. he has

been taking his diuretics as prescribed. Denies any associated headache, vision 

change, neck pain, back pain, abdominal pain, nausea/vomiting, focal weakness, 

sensory changes, room spinning dizziness, dysuria, or any other complaints.  He 

is fully vaccinated against COVID-19.  Patient has a history of smoking and 

alcohol use, stopped both of them several years ago. Worked with [employer].





Patient awake, resting on room air. Patient says he is feeling tired. O2 

saturation running 98%


Patient afebrile, no leukocytosis. /56 Pulse 71 Respirations 15





Chest xray on 01/08/22 reported Cardiomegaly without CHF


CT  scan of chest on 01/08/22 reported Cardiomegaly and pulmonary venous 

hypertension, Mild emphysema, Mild biliary ductal dilatation.   





Patient is on Famotidine  











Objective


                               Vital Signs - 12hr











  01/09/22 01/09/22 01/09/22





  21:01 21:15 21:31


 


Temperature   


 


Pulse Rate 77 77 76


 


Pulse Rate [   





From Monitor]   


 


Respiratory 13 13 12





Rate   


 


Blood Pressure 115/67 111/61 109/63


 


O2 Sat by Pulse 99 100 99





Oximetry   














  01/09/22 01/09/22 01/09/22





  21:45 22:01 22:15


 


Temperature   


 


Pulse Rate 77 79 78


 


Pulse Rate [   





From Monitor]   


 


Respiratory 12 14 14





Rate   


 


Blood Pressure 128/76 103/62 103/62


 


O2 Sat by Pulse 99 100 98





Oximetry   














  01/09/22 01/09/22 01/09/22





  22:31 22:45 22:57


 


Temperature   


 


Pulse Rate 79 77 


 


Pulse Rate [   88





From Monitor]   


 


Respiratory 15 15 





Rate   


 


Blood Pressure 103/62 103/62 


 


O2 Sat by Pulse  74 L 100





Oximetry   














  01/09/22 01/09/22 01/09/22





  23:01 23:15 23:17


 


Temperature  98.9 F 


 


Pulse Rate 79 87 80


 


Pulse Rate [   





From Monitor]   


 


Respiratory 14 15 15





Rate   


 


Blood Pressure 103/62 103/62 103/62


 


O2 Sat by Pulse  98 





Oximetry   














  01/09/22 01/09/22 01/09/22





  23:31 23:35 23:45


 


Temperature   


 


Pulse Rate 79 83 78


 


Pulse Rate [   





From Monitor]   


 


Respiratory 13  15





Rate   


 


Blood Pressure 103/62  103/62


 


O2 Sat by Pulse   





Oximetry   














  01/10/22 01/10/22 01/10/22





  00:01 00:15 00:31


 


Temperature   


 


Pulse Rate 78 76 77


 


Pulse Rate [   





From Monitor]   


 


Respiratory 14 13 14





Rate   


 


Blood Pressure 85/54 68/46 87/56


 


O2 Sat by Pulse 99 99 98





Oximetry   














  01/10/22 01/10/22 01/10/22





  00:45 01:01 01:15


 


Temperature   


 


Pulse Rate 76 78 77


 


Pulse Rate [   





From Monitor]   


 


Respiratory 14 15 16





Rate   


 


Blood Pressure 74/50 89/50 89/50


 


O2 Sat by Pulse 98 97 100





Oximetry   














  01/10/22 01/10/22 01/10/22





  01:31 01:45 02:00


 


Temperature   


 


Pulse Rate 77 79 


 


Pulse Rate [   88





From Monitor]   


 


Respiratory 13 13 





Rate   


 


Blood Pressure 105/68 99/78 


 


O2 Sat by Pulse 99 72 L 100





Oximetry   














  01/10/22 01/10/22 01/10/22





  02:01 02:15 02:31


 


Temperature   


 


Pulse Rate 77 77 79


 


Pulse Rate [   





From Monitor]   


 


Respiratory 16 16 15





Rate   


 


Blood Pressure 101/75 97/68 105/70


 


O2 Sat by Pulse  98 98





Oximetry   














  01/10/22 01/10/22 01/10/22





  02:45 03:01 03:15


 


Temperature   


 


Pulse Rate 78 77 78


 


Pulse Rate [   





From Monitor]   


 


Respiratory 15 14 12





Rate   


 


Blood Pressure 117/67 100/68 114/67


 


O2 Sat by Pulse 99 97 99





Oximetry   














  01/10/22 01/10/22 01/10/22





  03:31 03:45 04:00


 


Temperature   97.1 F L


 


Pulse Rate 79 78 


 


Pulse Rate [   88





From Monitor]   


 


Respiratory 13 14 





Rate   


 


Blood Pressure 129/54 107/70 


 


O2 Sat by Pulse 97 97 100





Oximetry   














  01/10/22 01/10/22 01/10/22





  04:01 04:15 04:31


 


Temperature   


 


Pulse Rate 79 80 81


 


Pulse Rate [   





From Monitor]   


 


Respiratory 12 12 14





Rate   


 


Blood Pressure 99/67 103/74 119/72


 


O2 Sat by Pulse 97 99 98





Oximetry   














  01/10/22 01/10/22 01/10/22





  04:45 05:01 05:11


 


Temperature   


 


Pulse Rate 82 80 80


 


Pulse Rate [   





From Monitor]   


 


Respiratory 15 13 26 H





Rate   


 


Blood Pressure 119/77 105/70 105/70


 


O2 Sat by Pulse 98 98 99





Oximetry   














  01/10/22 01/10/22 01/10/22





  05:21 05:31 05:41


 


Temperature   


 


Pulse Rate 86 86 87


 


Pulse Rate [   





From Monitor]   


 


Respiratory 15 16 15





Rate   


 


Blood Pressure 111/65 111/65 111/65


 


O2 Sat by Pulse 82 L 99 98





Oximetry   














  01/10/22 01/10/22 01/10/22





  05:51 06:01 06:11


 


Temperature   


 


Pulse Rate 82 80 81


 


Pulse Rate [   





From Monitor]   


 


Respiratory 14 12 16





Rate   


 


Blood Pressure 122/80 102/76 102/76


 


O2 Sat by Pulse 99 98 95





Oximetry   














  01/10/22 01/10/22 01/10/22





  06:21 06:31 06:41


 


Temperature   


 


Pulse Rate 81 82 82


 


Pulse Rate [   





From Monitor]   


 


Respiratory 14 15 17





Rate   


 


Blood Pressure 102/76 112/63 112/63


 


O2 Sat by Pulse 100 99 99





Oximetry   














  01/10/22 01/10/22





  06:51 07:01


 


Temperature  


 


Pulse Rate 83 83


 


Pulse Rate [  





From Monitor]  


 


Respiratory 15 12





Rate  


 


Blood Pressure 106/66 98/67


 


O2 Sat by Pulse 94 96





Oximetry  











Constitutional: no acute distress, alert


Eyes: icteric


ENT: oropharynx moist


Neck: supple, no lymphadenopathy


Effort: mildly labored


Ascultation: Bilateral: diminished breath sounds


Cardiovascular: regular rate and rhythm


Gastrointestinal: normoactive bowel sounds


Integumentary: normal


Extremities: no cyanosis, other (trace edema .)


Neurologic: non-focal exam, pupils equal and round


Psychiatric: depressed


CBC and BMP: 


                                 01/10/22 05:20





                                 01/10/22 05:20


ABG, PT/INR, D-dimer: 


PT/INR, D-dimer











PT  21.0 Sec. (12.2-14.9)  H  01/08/22  10:08    


 


INR  1.64  (0.87-1.13)  H  01/08/22  10:08    








Abnormal lab findings: 


                                  Abnormal Labs











  01/08/22 01/08/22 01/08/22





  10:08 10:08 10:08


 


WBC   


 


Hgb  17.6 H  


 


Hct  53.0 H  


 


MCV  107 H  


 


MCH  35 H  


 


RDW  15.7 H  


 


Plt Count  82 L  


 


[place] % (Auto)   


 


Seg Neuts % (Manual)  92.0 H  


 


Lymphocytes % (Manual)  4.0 L  


 


Seg Neutrophils # Man   


 


Lymphocytes # (Manual)  0.3 L  


 


PT   21.0 H 


 


INR   1.64 H 


 


Sodium   


 


Potassium   


 


Chloride   


 


Carbon Dioxide   


 


BUN   


 


Glucose   


 


Lactic Acid   


 


Calcium   


 


Magnesium   


 


Total Bilirubin   


 


Direct Bilirubin   


 


AST   


 


ALT   


 


Alkaline Phosphatase   


 


NT-Pro-B Natriuret Pep    2571 H


 


Total Protein   


 


Albumin   


 


Salicylates   


 


Acetaminophen   














  01/08/22 01/08/22 01/08/22





  10:10 12:34 12:34


 


WBC   


 


Hgb   


 


Hct   


 


MCV   


 


MCH   


 


RDW   


 


Plt Count   


 


Mono % (Auto)   


 


Seg Neuts % (Manual)   


 


Lymphocytes % (Manual)   


 


Seg Neutrophils # Man   


 


Lymphocytes # (Manual)   


 


PT   


 


INR   


 


Sodium  112 L*  


 


Potassium   


 


Chloride  77.6 L  


 


Carbon Dioxide   


 


BUN   


 


Glucose   


 


Lactic Acid   


 


Calcium  8.3 L  


 


Magnesium  1.60 L  


 


Total Bilirubin  8.90 H  


 


Direct Bilirubin  3.5 H  


 


AST  118 H  


 


ALT  58 H  


 


Alkaline Phosphatase  359 H  


 


NT-Pro-B Natriuret Pep   


 


Total Protein   


 


Albumin  2.9 L  


 


Salicylates   < 0.3 L 


 


Acetaminophen    5.0 L














  01/09/22 01/09/22 01/09/22





  04:58 04:58 19:50


 


WBC  11.9 H  


 


Hgb  16.8 H  


 


Hct  50.2 H  


 


MCV  107 H  


 


MCH  36 H  


 


RDW  15.9 H  


 


Plt Count  94 L  


 


[place] % (Auto)   


 


Seg Neuts % (Manual)  94.0 H  


 


Lymphocytes % (Manual)  5.0 L  


 


Seg Neutrophils # Man  11.2 H  


 


Lymphocytes # (Manual)  0.6 L  


 


PT   


 


INR   


 


Sodium   116 L* 


 


Potassium   5.7 H 


 


Chloride   81.4 L 


 


Carbon Dioxide   


 


BUN   21 H 


 


Glucose   55 L 


 


Lactic Acid    2.90 H*


 


Calcium   8.3 L 


 


Magnesium   


 


Total Bilirubin   9.80 H 


 


Direct Bilirubin   


 


AST   129 H 


 


ALT   60 H 


 


Alkaline Phosphatase   303 H 


 


NT-Pro-B Natriuret Pep   


 


Total Protein   


 


Albumin   2.5 L 


 


Salicylates   


 


Acetaminophen   














  01/09/22 01/09/22 01/10/22





  22:21 22:21 05:20


 


WBC   


 


Hgb  15.6 H  


 


Hct  46.9 H  


 


MCV  109 H   108 H


 


MCH  36 H   36 H


 


RDW  16.1 H   16.0 H


 


Plt Count  66 L   67 L


 


Mono % (Auto)  13.6 H  


 


Seg Neuts % (Manual)    82.0 H


 


Lymphocytes % (Manual)    12.0 L


 


Seg Neutrophils # Man   


 


Lymphocytes # (Manual)    1.0 L


 


PT   


 


INR   


 


Sodium   120 L 


 


Potassium   5.6 H 


 


Chloride   89.1 L 


 


Carbon Dioxide   18 L 


 


BUN   28 H 


 


Glucose   121 H 


 


Lactic Acid   


 


Calcium   7.5 L 


 


Magnesium   


 


Total Bilirubin   7.90 H 


 


Direct Bilirubin   


 


AST   115 H 


 


ALT   


 


Alkaline Phosphatase   304 H 


 


NT-Pro-B Natriuret Pep   


 


Total Protein   5.6 L 


 


Albumin   2.1 L 


 


Salicylates   


 


Acetaminophen   














  01/10/22





  05:20


 


WBC 


 


Hgb 


 


Hct 


 


MCV 


 


MCH 


 


RDW 


 


Plt Count 


 


Mono % (Auto) 


 


Seg Neuts % (Manual) 


 


Lymphocytes % (Manual) 


 


Seg Neutrophils # Man 


 


Lymphocytes # (Manual) 


 


PT 


 


INR 


 


Sodium  124 L


 


Potassium 


 


Chloride  90.8 L


 


Carbon Dioxide 


 


BUN  30 H


 


Glucose 


 


Lactic Acid 


 


Calcium  8.0 L


 


Magnesium 


 


Total Bilirubin  6.70 H


 


Direct Bilirubin 


 


AST  97 H


 


ALT 


 


Alkaline Phosphatase  318 H


 


NT-Pro-B Natriuret Pep 


 


Total Protein  5.1 L


 


Albumin  2.2 L


 


Salicylates 


 


Acetaminophen 











Chest x-ray: report reviewed, image reviewed


CT scan - chest: report reviewed, image reviewed


Additional Studies: 





CHEST 2 VIEWS   1/8/2022


 


 INDICATION / CLINICAL INFORMATION:  


 Chest Pain/sob.  


 


 COMPARISON:   


 None available.  


 


 FINDINGS:  


 


 SUPPORT DEVICES: None.  


 HEART / MEDIASTINUM: Cardiomegaly   


 LUNGS / PLEURA: No significant pulmonary or pleural abnormality. No 

pneumothorax.   


 


 ADDITIONAL FINDINGS: No significant additional findings.  


 


 IMPRESSION:  


 1. Cardiomegaly without CHF  


 


CT CHEST, ABDOMEN, AND PELVIS WITH IV CONTRAST  1/8/2022


 


 INDICATION / CLINICAL INFORMATION:  


 SOB, liver failure.  


 


 TECHNIQUE:  


 Axial CT images were obtained through the chest, abdomen, and pelvis after IV 

contrast. All CT 


scans at this location are performed using CT dose reduction for ALARA by means 

of automated 


exposure control.   


 


 COMPARISON:  


 None available.  


 


 FINDINGS: Limited by moderate respiratory motion artifact  


 HEART: Cardiomegaly   


 THORACIC AORTA: No significant abnormality.   


 MEDIASTINUM and ERLIN: No significant abnormality.  


 LUNGS: No acute air space or interstitial disease.  


 PLEURA: No significant pleural effusion. No pneumothorax.  


 ADDITIONAL CHEST FINDINGS: Dilated main, right and left pulmonary arteries 

characteristic for 


pulmonary arterial hypertension. The main pulmonary artery measures 4.4 cm in 

right pulmonary artery


measures 3.3 cm. No visualized pulmonary embolus. Bilateral symmetric 

gynecomastia  


 


 LIVER: No significant abnormality.  


 GALLBLADDER: No significant abnormality.    


 BILE DUCTS: Mild biliary ductal dilatation. Common duct measures 7 mm.  


 PANCREAS: No significant abnormality.  


 SPLEEN: No significant abnormality.  


 ADRENALS: No significant abnormality.  


 RIGHT KIDNEY and URETER: No significant abnormality.  


 LEFT KIDNEY and URETER: No significant abnormality.  


 


 STOMACH and SMALL BOWEL: No significant abnormality.   


 COLON: No significant abnormality.   


 APPENDIX: No significant abnormality.    


 PERITONEUM: No free fluid. No free air. No fluid collection.  


 LYMPH NODES: No significant adenopathy.  


 AORTA and ARTERIES: No significant abnormality.   


 IVC and VEINS: No significant abnormality.  


 


 URINARY BLADDER: No significant abnormality.  


 REPRODUCTIVE ORGANS: No significant abnormality.  


 


 ADDITIONAL FINDINGS: None.  


 


 SKELETAL SYSTEM: Nonaggressive 2.5 cm lytic lesion with type Ia sclerotic 

borders likely represents


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  


 


 IMPRESSION:  


 1. Cardiomegaly and pulmonary venous hypertension.  


 2. Mild emphysema  


 3. Mild biliary ductal dilatation.

## 2022-01-10 NOTE — PROGRESS NOTE
Assessment and Plan


Critical care statement


The high probability OF a clinically significant sudden or life-threatening 

deterioration of the cardiorespiratory system and endocrine system required my 

full and direct attention, intervention and postoperative management.  The 

aggregate critical  care time was 33 minutes.  The time is in addition to time 

spent performing reported procedures but includes the followin: Data review and interpretation


2: Patient assessment and monitoring of vital signs


3: Documentation


4:: Medication orders and management





- Patient Problems


(1) Hyponatremia


Current Visit: Yes   Status: Acute   


Plan to address problem: 


Check urine osmolarity and serum osmolarity


Sodium improved from 112 to 116


3 percent NS --500 ml over 10 hours


Improved to 124


Will try desmopressin








(2) Liver failure


Current Visit: Yes   Status: Chronic   


Qualifiers: 


   Liver failure chronicity: chronic   Hepatic coma status: without hepatic coma

  Qualified Code(s): K72.10 - Chronic hepatic failure without coma   


Plan to address problem: 


Patient has end-stage lung disease


Elevated total bili of 8.9 and direct bili of 3.5, AST is 118 and ALT is 58








(3) Hypotension


Current Visit: Yes   Status: Acute   


Qualifiers: 


   Hypotension type: unspecified hypotension type   Qualified Code(s): I95.9 - 

Hypotension, unspecified   


Plan to address problem: 


Patient is on Levophed at 20 mics 








(4) Cirrhosis of liver


Current Visit: Yes   Status: Chronic   


Qualifiers: 


   Ascites presence: without ascites 


Plan to address problem: 


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


Patient is a poor historian


Says he has no family











(5) Hypomagnesemia


Current Visit: Yes   Status: Acute   


Plan to address problem: 


Supplemented with IV magnesium


Corrected








(6) Polycythemia due to fall in plasma volume


Current Visit: Yes   Status: Acute   


Plan to address problem: 


Gentle hydration given the elevated BNP


Improved








(7) Malnutrition


Current Visit: Yes   Status: Chronic   


Qualifiers: 


   Protein-calorie malnutrition severity: moderate 


Plan to address problem: 


Dietary supplements requested








(8) Elevated brain natriuretic peptide (BNP) level


Current Visit: Yes   Status: Acute   


Plan to address problem: 


Echocardiogram for ejection fraction and valve function








(9) DVT prophylaxis


Current Visit: Yes   Status: Acute   


Plan to address problem: 


SCDs and GI prophylaxis








Subjective


Date of service: 01/10/22


Principal diagnosis: Hyponatremia,Liver failure


Interval history: 





46-year-old male with history of CAD and liver failure in the past of unclear 

etiology and chronicity presents complaining of proximately 5 days of 

progressive dyspnea on exertion as well as lower extremity edema.  Patient is 

unable to identify the cause or the amount of time that he has had liver failure

but says this has been an ongoing problem and he has developed ascites in the 

past, however, the last time he needed a paracentesis was approximately 4 years 

ago.  He also says he has had subjective fever, mild dry cough, and intermittent

chest pain which she is unable to describe.  There are no known aggravating or 

alleviating factors.  He has not tried to take anything for his symptoms. he has

been taking his diuretics as prescribed. Denies any associated headache, vision 

change, neck pain, back pain, abdominal pain, nausea/vomiting, focal weakness, 

sensory changes, room spinning dizziness, dysuria, or any other complaints.  He 

is fully vaccinated against COVID-19.  He denies alcohol use.





22


Na112 to 116Sx better





1/10/2022


Sodium improved from 112 --116 yesterday and today it is 124


Patient is more alert


Patient has end-stage liver disease











Objective





- Constitutional


Vitals: 


                               Vital Signs - 12hr











  01/10/22 01/10/22 01/10/22





  02:45 03:01 03:15


 


Temperature   


 


Pulse Rate 78 77 78


 


Pulse Rate [   





From Monitor]   


 


Respiratory 15 14 12





Rate   


 


Blood Pressure 117/67 100/68 114/67


 


O2 Sat by Pulse 99 97 99





Oximetry   














  01/10/22 01/10/22 01/10/22





  03:31 03:45 04:00


 


Temperature   97.1 F L


 


Pulse Rate 79 78 


 


Pulse Rate [   88





From Monitor]   


 


Respiratory 13 14 





Rate   


 


Blood Pressure 129/54 107/70 


 


O2 Sat by Pulse 97 97 100





Oximetry   














  01/10/22 01/10/22 01/10/22





  04:01 04:15 04:31


 


Temperature   


 


Pulse Rate 79 80 81


 


Pulse Rate [   





From Monitor]   


 


Respiratory 12 12 14





Rate   


 


Blood Pressure 99/67 103/74 119/72


 


O2 Sat by Pulse 97 99 98





Oximetry   














  01/10/22 01/10/22 01/10/22





  04:45 05:01 05:11


 


Temperature   


 


Pulse Rate 82 80 80


 


Pulse Rate [   





From Monitor]   


 


Respiratory 15 13 26 H





Rate   


 


Blood Pressure 119/77 105/70 105/70


 


O2 Sat by Pulse 98 98 99





Oximetry   














  01/10/22 01/10/22 01/10/22





  05:21 05:31 05:41


 


Temperature   


 


Pulse Rate 86 86 87


 


Pulse Rate [   





From Monitor]   


 


Respiratory 15 16 15





Rate   


 


Blood Pressure 111/65 111/65 111/65


 


O2 Sat by Pulse 82 L 99 98





Oximetry   














  01/10/22 01/10/22 01/10/22





  05:51 06:01 06:11


 


Temperature   


 


Pulse Rate 82 80 81


 


Pulse Rate [   





From Monitor]   


 


Respiratory 14 12 16





Rate   


 


Blood Pressure 122/80 102/76 102/76


 


O2 Sat by Pulse 99 98 95





Oximetry   














  01/10/22 01/10/22 01/10/22





  06:21 06:31 06:41


 


Temperature   


 


Pulse Rate 81 82 82


 


Pulse Rate [   





From Monitor]   


 


Respiratory 14 15 17





Rate   


 


Blood Pressure 102/76 112/63 112/63


 


O2 Sat by Pulse 100 99 99





Oximetry   














  01/10/22 01/10/22





  06:51 07:01


 


Temperature  


 


Pulse Rate 83 83


 


Pulse Rate [  





From Monitor]  


 


Respiratory 15 12





Rate  


 


Blood Pressure 106/66 98/67


 


O2 Sat by Pulse 94 96





Oximetry  











General appearance: Present: no acute distress, well-nourished





- EENT


Eyes: PERRL, EOM intact


ENT: hearing intact, clear oral mucosa


Ears: bilateral: normal





- Neck


Neck: supple, normal ROM





- Respiratory


Respiratory effort: normal


Respiratory: bilateral: CTA





- Breasts


Breasts: normal





- Cardiovascular


Heart rate: 78


Rhythm: regular


Heart Sounds: Present: S1 & S2.  Absent: gallop, rub


Extremities: pulses intact, No edema, normal color, Full ROM





- Gastrointestinal


General gastrointestinal: Present: soft, non-tender, distended (Mild distention 

present), normal bowel sounds





- Genitourinary


Male genitourinary: normal





- Integumentary


Integumentary: clear, warm, dry





- Musculoskeletal


Musculoskeletal: 1, strength equal bilaterally





- Neurologic


Neurologic: moves all extremities





- Psychiatric


Psychiatric: memory intact, appropriate mood/affect, intact judgment & insight





- Labs


CBC & Chem 7: 


                                 01/10/22 05:20





                                 01/10/22 05:20


Labs: 


                              Abnormal lab results











  22 Range/Units





  19:50 22:21 22:21 


 


Hgb   15.6 H   (11.8-15.2)  gm/dl


 


Hct   46.9 H   (35.5-45.6)  %


 


MCV   109 H   (84-94)  fl


 


MCH   36 H   (28-32)  pg


 


RDW   16.1 H   (13.2-15.2)  %


 


Plt Count   66 L   (140-440)  K/mm3


 


Mono % (Auto)   13.6 H   (0.0-7.3)  %


 


Seg Neuts % (Manual)     (40.0-70.0)  %


 


Lymphocytes % (Manual)     (13.4-35.0)  %


 


Lymphocytes # (Manual)     (1.2-5.4)  K/mm3


 


Sodium    120 L  (137-145)  mmol/L


 


Potassium    5.6 H  (3.6-5.0)  mmol/L


 


Chloride    89.1 L  ()  mmol/L


 


Carbon Dioxide    18 L  (22-30)  mmol/L


 


BUN    28 H  (9-20)  mg/dL


 


Glucose    121 H  ()  mg/dL


 


Lactic Acid  2.90 H*    (0.7-2.0)  mmol/L


 


Calcium    7.5 L  (8.4-10.2)  mg/dL


 


Total Bilirubin    7.90 H  (0.1-1.2)  mg/dL


 


AST    115 H  (5-40)  units/L


 


Alkaline Phosphatase    304 H  ()  units/L


 


Total Protein    5.6 L  (6.3-8.2)  g/dL


 


Albumin    2.1 L  (3.9-5)  g/dL














  01/10/22 01/10/22 Range/Units





  05:20 05:20 


 


Hgb    (11.8-15.2)  gm/dl


 


Hct    (35.5-45.6)  %


 


MCV  108 H   (84-94)  fl


 


MCH  36 H   (28-32)  pg


 


RDW  16.0 H   (13.2-15.2)  %


 


Plt Count  67 L   (140-440)  K/mm3


 


Mono % (Auto)    (0.0-7.3)  %


 


Seg Neuts % (Manual)  82.0 H   (40.0-70.0)  %


 


Lymphocytes % (Manual)  12.0 L   (13.4-35.0)  %


 


Lymphocytes # (Manual)  1.0 L   (1.2-5.4)  K/mm3


 


Sodium   124 L  (137-145)  mmol/L


 


Potassium    (3.6-5.0)  mmol/L


 


Chloride   90.8 L  ()  mmol/L


 


Carbon Dioxide    (22-30)  mmol/L


 


BUN   30 H  (9-20)  mg/dL


 


Glucose    ()  mg/dL


 


Lactic Acid    (0.7-2.0)  mmol/L


 


Calcium   8.0 L  (8.4-10.2)  mg/dL


 


Total Bilirubin   6.70 H  (0.1-1.2)  mg/dL


 


AST   97 H  (5-40)  units/L


 


Alkaline Phosphatase   318 H  ()  units/L


 


Total Protein   5.1 L  (6.3-8.2)  g/dL


 


Albumin   2.2 L  (3.9-5)  g/dL














HEART Score





- HEART Score


Troponin: 


                                        











Troponin T  < 0.010 ng/mL (0.00-0.029)   22  12:34

## 2022-01-11 LAB
ALBUMIN SERPL-MCNC: 2.3 G/DL (ref 3.9–5)
ALT SERPL-CCNC: 57 UNITS/L (ref 7–56)
BAND NEUTROPHILS # (MANUAL): 0 K/MM3
BUN SERPL-MCNC: 22 MG/DL (ref 9–20)
BUN/CREAT SERPL: 44 %
BURR CELLS BLD QL SMEAR: (no result)
CALCIUM SERPL-MCNC: 8.2 MG/DL (ref 8.4–10.2)
HCT VFR BLD CALC: 47.3 % (ref 35.5–45.6)
HEMOLYSIS INDEX: 27
HGB BLD-MCNC: 15.6 GM/DL (ref 11.8–15.2)
MCHC RBC AUTO-ENTMCNC: 33 % (ref 32–34)
MCV RBC AUTO: 108 FL (ref 84–94)
MYELOCYTES # (MANUAL): 0 K/MM3
PLATELET # BLD: 40 K/MM3 (ref 140–440)
POIKILOCYTOSIS BLD QL SMEAR: (no result)
PROMYELOCYTES # (MANUAL): 0 K/MM3
RBC # BLD AUTO: 4.36 M/MM3 (ref 3.65–5.03)
TOTAL CELLS COUNTED BLD: 100
TOXIC GRANULES BLD QL SMEAR: (no result)

## 2022-01-11 RX ADMIN — Medication SCH ML: at 21:33

## 2022-01-11 RX ADMIN — Medication SCH ML: at 00:42

## 2022-01-11 RX ADMIN — OXYCODONE AND ACETAMINOPHEN PRN TAB: 5; 325 TABLET ORAL at 21:34

## 2022-01-11 RX ADMIN — DESMOPRESSIN ACETATE SCH MCG: 4 INJECTION INTRAVENOUS; SUBCUTANEOUS at 09:50

## 2022-01-11 RX ADMIN — FAMOTIDINE SCH MG: 10 INJECTION, SOLUTION INTRAVENOUS at 00:42

## 2022-01-11 RX ADMIN — FAMOTIDINE SCH MG: 10 INJECTION, SOLUTION INTRAVENOUS at 21:33

## 2022-01-11 RX ADMIN — KETOROLAC TROMETHAMINE SCH: 30 INJECTION, SOLUTION INTRAMUSCULAR at 00:41

## 2022-01-11 RX ADMIN — Medication SCH ML: at 09:51

## 2022-01-11 RX ADMIN — KETOROLAC TROMETHAMINE SCH: 30 INJECTION, SOLUTION INTRAMUSCULAR at 11:50

## 2022-01-11 RX ADMIN — FAMOTIDINE SCH MG: 10 INJECTION, SOLUTION INTRAVENOUS at 09:50

## 2022-01-11 RX ADMIN — KETOROLAC TROMETHAMINE SCH: 30 INJECTION, SOLUTION INTRAMUSCULAR at 06:31

## 2022-01-11 RX ADMIN — SODIUM CHLORIDE ONE MLS/HR: 3 INJECTION, SOLUTION INTRAVENOUS at 00:18

## 2022-01-11 RX ADMIN — KETOROLAC TROMETHAMINE SCH: 30 INJECTION, SOLUTION INTRAMUSCULAR at 17:42

## 2022-01-11 RX ADMIN — DESMOPRESSIN ACETATE SCH MCG: 4 INJECTION INTRAVENOUS; SUBCUTANEOUS at 00:40

## 2022-01-11 NOTE — PROGRESS NOTE
Assessment and Plan





46-year-old male with history of CAD and liver failure in the past of unclear 

etiology and chronicity presents complaining of proximately 5 days of 

progressive dyspnea on exertion as well as lower extremity edema.  Patient is 

unable to identify the cause or the amount of time that he has had liver failure

but says this has been an ongoing problem and he has developed ascites in the 

past, however, the last time he needed a paracentesis was approximately 4 years 

ago.  He also says he has had subjective fever, mild dry cough, and intermittent

chest pain which she is unable to describe.  There are no known aggravating or 

alleviating factors.  He has not tried to take anything for his symptoms. he has

been taking his diuretics as prescribed. Denies any associated headache, vision 

change, neck pain, back pain, abdominal pain, nausea/vomiting, focal weakness, 

sensory changes, room spinning dizziness, dysuria, or any other complaints.  He 

is fully vaccinated against COVID-19.  Patient has a history of smoking and 

alcohol use, stopped both of them several years ago. Worked with [employer].





Patient awake, resting on room air. No acute respiratory distress. Complaining 

of abdominal pain. O2 saturation running 94%


Patient afebrile, no leukocytosis. /71 Pulse 103 Respirations 20





Chest xray on 01/08/22 reported Cardiomegaly without CHF


CT  scan of chest on 01/08/22 reported Cardiomegaly and pulmonary venous 

hypertension, Mild emphysema, Mild biliary ductal dilatation.   





Patient is on Famotidine  








- Patient Problems


(1) Heart failure


Current Visit: Yes   Status: Acute   


Plan to address problem: 


Management as for primary care and cardiology








(2) Hypotension


Current Visit: Yes   Status: Acute   


Qualifiers: 


   Hypotension type: unspecified hypotension type   Qualified Code(s): I95.9 - 

Hypotension, unspecified   


Plan to address problem: 


Blood pressure improved. 103/71








(3) Metabolic encephalopathy


Current Visit: Yes   Status: Acute   


Plan to address problem: 


Management as for primary care








(4) Thrombocytopenia


Current Visit: Yes   Status: Acute   


Plan to address problem: 


Management as for primary care and hematology








(5) Liver failure


Current Visit: Yes   Status: Chronic   


Qualifiers: 


   Liver failure chronicity: chronic   Hepatic coma status: without hepatic coma

  Qualified Code(s): K72.10 - Chronic hepatic failure without coma   


Plan to address problem: 


Management as for gastroenterology 








(6) Malnutrition


Current Visit: Yes   Status: Chronic   


Qualifiers: 


   Protein-calorie malnutrition severity: moderate 


Plan to address problem: 


Consult dietician 








(7) Hyponatremia


Current Visit: Yes   Status: Acute   


Plan to address problem: 


Patient is on IV normal saline. Management as for nephrology








Subjective


Date of service: 01/11/22


Principal diagnosis: Hyponatremia,Liver failure


Interval history: 





46-year-old male with history of CAD and liver failure in the past of unclear 

etiology and chronicity presents complaining of proximately 5 days of 

progressive dyspnea on exertion as well as lower extremity edema.  Patient is 

unable to identify the cause or the amount of time that he has had liver failure

but says this has been an ongoing problem and he has developed ascites in the 

past, however, the last time he needed a paracentesis was approximately 4 years 

ago.  He also says he has had subjective fever, mild dry cough, and intermittent

chest pain which she is unable to describe.  There are no known aggravating or 

alleviating factors.  He has not tried to take anything for his symptoms. he has

been taking his diuretics as prescribed. Denies any associated headache, vision 

change, neck pain, back pain, abdominal pain, nausea/vomiting, focal weakness, 

sensory changes, room spinning dizziness, dysuria, or any other complaints.  He 

is fully vaccinated against COVID-19.  Patient has a history of smoking and 

alcohol use, stopped both of them several years ago. Worked with [employer].





Patient awake, resting on room air. No acute respiratory distress. Complaining 

of abdominal pain. O2 saturation running 94%


Patient afebrile, no leukocytosis. /71 Pulse 103 Respirations 20





Chest xray on 01/08/22 reported Cardiomegaly without CHF


CT  scan of chest on 01/08/22 reported Cardiomegaly and pulmonary venous 

hypertension, Mild emphysema, Mild biliary ductal dilatation.   





Patient is on Famotidine  











Objective


                               Vital Signs - 12hr











  01/11/22 01/11/22 01/11/22





  07:50 07:51 08:00


 


Temperature   


 


Pulse Rate 86  


 


Pulse Rate [   71





From Monitor]   


 


Respiratory 13 0 L 18





Rate   


 


Blood Pressure  109/85 


 


Blood Pressure 117/86  





[Left]   


 


O2 Sat by Pulse 96  97





Oximetry   














  01/11/22 01/11/22 01/11/22





  08:01 08:29 08:31


 


Temperature   


 


Pulse Rate  59 L 59 L


 


Pulse Rate [   





From Monitor]   


 


Respiratory 0 L  22





Rate   


 


Blood Pressure 109/85 117/86 117/86


 


Blood Pressure   





[Left]   


 


O2 Sat by Pulse  100 99





Oximetry   














  01/11/22 01/11/22 01/11/22





  11:53 18:24 18:25


 


Temperature 98.1 F 98.0 F 


 


Pulse Rate 85 82 85


 


Pulse Rate [   





From Monitor]   


 


Respiratory 17  





Rate   


 


Blood Pressure  123/91 


 


Blood Pressure 120/80  





[Left]   


 


O2 Sat by Pulse 95 77 L 99





Oximetry   











Constitutional: alert, appears uncomfortable, other (complaining of abdominal 

pain)


Eyes: icteric


ENT: oropharynx moist


Neck: supple, no lymphadenopathy


Effort: mildly labored


Ascultation: Bilateral: diminished breath sounds


Cardiovascular: regular rate and rhythm


Gastrointestinal: normoactive bowel sounds


Integumentary: normal


Extremities: no cyanosis, other (trace edema .)


Neurologic: non-focal exam, pupils equal and round


Psychiatric: depressed


CBC and BMP: 


                                 01/11/22 15:07





                                 01/11/22 15:07


ABG, PT/INR, D-dimer: 


PT/INR, D-dimer











PT  21.0 Sec. (12.2-14.9)  H  01/08/22  10:08    


 


INR  1.64  (0.87-1.13)  H  01/08/22  10:08    








Abnormal lab findings: 


                                  Abnormal Labs











  01/08/22 01/08/22 01/08/22





  10:08 10:08 10:08


 


WBC   


 


Hgb  17.6 H  


 


Hct  53.0 H  


 


MCV  107 H  


 


MCH  35 H  


 


RDW  15.7 H  


 


Plt Count  82 L  


 


[place] % (Auto)   


 


Seg Neuts % (Manual)  92.0 H  


 


Lymphocytes % (Manual)  4.0 L  


 


Monocytes % (Manual)   


 


Seg Neutrophils # Man   


 


Lymphocytes # (Manual)  0.3 L  


 


Monocytes # (Manual)   


 


PT   21.0 H 


 


INR   1.64 H 


 


Sodium   


 


Potassium   


 


Chloride   


 


Carbon Dioxide   


 


BUN   


 


Creatinine   


 


Glucose   


 


POC Glucose   


 


Lactic Acid   


 


Calcium   


 


Magnesium   


 


Total Bilirubin   


 


Direct Bilirubin   


 


AST   


 


ALT   


 


Alkaline Phosphatase   


 


NT-Pro-B Natriuret Pep    2571 H


 


Total Protein   


 


Albumin   


 


Salicylates   


 


Acetaminophen   














  01/08/22 01/08/22 01/08/22





  10:10 12:34 12:34


 


WBC   


 


Hgb   


 


Hct   


 


MCV   


 


MCH   


 


RDW   


 


Plt Count   


 


Mono % (Auto)   


 


Seg Neuts % (Manual)   


 


Lymphocytes % (Manual)   


 


Monocytes % (Manual)   


 


Seg Neutrophils # Man   


 


Lymphocytes # (Manual)   


 


Monocytes # (Manual)   


 


PT   


 


INR   


 


Sodium  112 L*  


 


Potassium   


 


Chloride  77.6 L  


 


Carbon Dioxide   


 


BUN   


 


Creatinine   


 


Glucose   


 


POC Glucose   


 


Lactic Acid   


 


Calcium  8.3 L  


 


Magnesium  1.60 L  


 


Total Bilirubin  8.90 H  


 


Direct Bilirubin  3.5 H  


 


AST  118 H  


 


ALT  58 H  


 


Alkaline Phosphatase  359 H  


 


NT-Pro-B Natriuret Pep   


 


Total Protein   


 


Albumin  2.9 L  


 


Salicylates   < 0.3 L 


 


Acetaminophen    5.0 L














  01/09/22 01/09/22 01/09/22





  04:58 04:58 19:50


 


WBC  11.9 H  


 


Hgb  16.8 H  


 


Hct  50.2 H  


 


MCV  107 H  


 


MCH  36 H  


 


RDW  15.9 H  


 


Plt Count  94 L  


 


[place] % (Auto)   


 


Seg Neuts % (Manual)  94.0 H  


 


Lymphocytes % (Manual)  5.0 L  


 


Monocytes % (Manual)   


 


Seg Neutrophils # Man  11.2 H  


 


Lymphocytes # (Manual)  0.6 L  


 


Monocytes # (Manual)   


 


PT   


 


INR   


 


Sodium   116 L* 


 


Potassium   5.7 H 


 


Chloride   81.4 L 


 


Carbon Dioxide   


 


BUN   21 H 


 


Creatinine   


 


Glucose   55 L 


 


POC Glucose   


 


Lactic Acid    2.90 H*


 


Calcium   8.3 L 


 


Magnesium   


 


Total Bilirubin   9.80 H 


 


Direct Bilirubin   


 


AST   129 H 


 


ALT   60 H 


 


Alkaline Phosphatase   303 H 


 


NT-Pro-B Natriuret Pep   


 


Total Protein   


 


Albumin   2.5 L 


 


Salicylates   


 


Acetaminophen   














  01/09/22 01/09/22 01/10/22





  22:21 22:21 05:20


 


WBC   


 


Hgb  15.6 H  


 


Hct  46.9 H  


 


MCV  109 H   108 H


 


MCH  36 H   36 H


 


RDW  16.1 H   16.0 H


 


Plt Count  66 L   67 L


 


Mono % (Auto)  13.6 H  


 


Seg Neuts % (Manual)    82.0 H


 


Lymphocytes % (Manual)    12.0 L


 


Monocytes % (Manual)   


 


Seg Neutrophils # Man   


 


Lymphocytes # (Manual)    1.0 L


 


Monocytes # (Manual)   


 


PT   


 


INR   


 


Sodium   120 L 


 


Potassium   5.6 H 


 


Chloride   89.1 L 


 


Carbon Dioxide   18 L 


 


BUN   28 H 


 


Creatinine   


 


Glucose   121 H 


 


POC Glucose   


 


Lactic Acid   


 


Calcium   7.5 L 


 


Magnesium   


 


Total Bilirubin   7.90 H 


 


Direct Bilirubin   


 


AST   115 H 


 


ALT   


 


Alkaline Phosphatase   304 H 


 


NT-Pro-B Natriuret Pep   


 


Total Protein   5.6 L 


 


Albumin   2.1 L 


 


Salicylates   


 


Acetaminophen   














  01/10/22 01/11/22 01/11/22





  05:20 11:49 15:07


 


WBC   


 


Hgb    15.6 H


 


Hct    47.3 H


 


MCV    108 H


 


MCH    36 H


 


RDW    16.3 H


 


Plt Count    40 L


 


Mono % (Auto)   


 


Seg Neuts % (Manual)    80.0 H


 


Lymphocytes % (Manual)    6.0 L


 


Monocytes % (Manual)    14.0 H


 


Seg Neutrophils # Man    7.8 H


 


Lymphocytes # (Manual)    0.6 L


 


Monocytes # (Manual)    1.4 H


 


PT   


 


INR   


 


Sodium  124 L  


 


Potassium   


 


Chloride  90.8 L  


 


Carbon Dioxide   


 


BUN  30 H  


 


Creatinine   


 


Glucose   


 


POC Glucose   107 H 


 


Lactic Acid   


 


Calcium  8.0 L  


 


Magnesium   


 


Total Bilirubin  6.70 H  


 


Direct Bilirubin   


 


AST  97 H  


 


ALT   


 


Alkaline Phosphatase  318 H  


 


NT-Pro-B Natriuret Pep   


 


Total Protein  5.1 L  


 


Albumin  2.2 L  


 


Salicylates   


 


Acetaminophen   














  01/11/22





  15:07


 


WBC 


 


Hgb 


 


Hct 


 


MCV 


 


MCH 


 


RDW 


 


Plt Count 


 


Mono % (Auto) 


 


Seg Neuts % (Manual) 


 


Lymphocytes % (Manual) 


 


Monocytes % (Manual) 


 


Seg Neutrophils # Man 


 


Lymphocytes # (Manual) 


 


Monocytes # (Manual) 


 


PT 


 


INR 


 


Sodium  123 L


 


Potassium 


 


Chloride  93.7 L


 


Carbon Dioxide  19 L


 


BUN  22 H


 


Creatinine  0.5 L D


 


Glucose  136 H


 


POC Glucose 


 


Lactic Acid 


 


Calcium  8.2 L


 


Magnesium 


 


Total Bilirubin  12.20 H


 


Direct Bilirubin 


 


AST  102 H


 


ALT  57 H


 


Alkaline Phosphatase  296 H


 


NT-Pro-B Natriuret Pep 


 


Total Protein  5.3 L


 


Albumin  2.3 L


 


Salicylates 


 


Acetaminophen

## 2022-01-11 NOTE — GASTROENTEROLOGY PROGRESS NOTE
Assessment and Plan


1. 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 

disease


- pt w/ ascites but no need for paracenthesis


- pt ammonia stable,


- diet as tolerated 


- Na management per primary team


- watch for signs possible alcohol withdrawal


- no changes at this time


- if stable in am ok to dc from GI standpoint


-will follow











Subjective


Date of service: 01/11/22


Principal diagnosis: Hyponatremia,Liver failure


Interval history: 


- no specific GI complaints overnight








Objective





- Constitutional


Vitals: 


                                        











Temp Pulse Resp BP Pulse Ox


 


 97.5 F L  74   18   118/83   97 


 


 01/11/22 19:20  01/11/22 20:52  01/11/22 21:34  01/11/22 19:20  01/11/22 20:52











General appearance: no acute distress





- EENT


Eyes: PERRL





- Respiratory


Respiratory: bilateral: rhonchi





- Cardiovascular


Rhythm: regular


Heart Sounds: Present: S1 & S2





- Gastrointestinal


General gastrointestinal: Present: soft, non-tender, non-distended





- Labs


CBC & Chem 7: 


                                 01/11/22 15:07





                                 01/11/22 15:07


Labs: 


                         Laboratory Results - last 24 hr











  01/11/22 01/11/22 01/11/22





  11:49 15:07 15:07


 


WBC   9.7 


 


RBC   4.36 


 


Hgb   15.6 H 


 


Hct   47.3 H 


 


MCV   108 H 


 


MCH   36 H 


 


MCHC   33 


 


RDW   16.3 H 


 


Plt Count   40 L 


 


Add Manual Diff   Complete 


 


Total Counted   100 


 


Seg Neuts % (Manual)   80.0 H 


 


Band Neutrophils %   0 


 


Lymphocytes % (Manual)   6.0 L 


 


Reactive Lymphs % (Man)   0 


 


Monocytes % (Manual)   14.0 H 


 


Eosinophils % (Manual)   0 


 


Basophils % (Manual)   0 


 


Metamyelocytes %   0 


 


Myelocytes %   0 


 


Promyelocytes %   0 


 


Blast Cells %   0 


 


Nucleated RBC %   Not Reportable 


 


Seg Neutrophils # Man   7.8 H 


 


Band Neutrophils #   0.0 


 


Lymphocytes # (Manual)   0.6 L 


 


Abs React Lymphs (Man)   0.0 


 


Monocytes # (Manual)   1.4 H 


 


Eosinophils # (Manual)   0.0 


 


Basophils # (Manual)   0.0 


 


Metamyelocytes #   0.0 


 


Myelocytes #   0.0 


 


Promyelocytes #   0.0 


 


Blast Cells #   0.0 


 


WBC Morphology   Not Reportable 


 


Hypersegmented Neuts   Not Reportable 


 


Hyposegmented Neuts   Not Reportable 


 


Hypogranular Neuts   Not Reportable 


 


Smudge Cells   Not Reportable 


 


Toxic Granulation   1+ 


 


Toxic Vacuolation   Few 


 


Dohle Bodies   Not Reportable 


 


Pelger-Huet Anomaly   Not Reportable 


 


Scar Rods   Not Reportable 


 


Platelet Estimate   Consistent w auto 


 


Clumped Platelets   Not Reportable 


 


Plt Clumps, EDTA   Not Reportable 


 


Large Platelets   Few 


 


Giant Platelets   Not Reportable 


 


Platelet Satelliting   Not Reportable 


 


Plt Morphology Comment   Not Reportable 


 


RBC Morphology   Not Reportable 


 


Dimorphic RBCs   Not Reportable 


 


Polychromasia   Not Reportable 


 


Hypochromasia   Not Reportable 


 


Poikilocytosis   1+ 


 


Anisocytosis   Not Reportable 


 


Microcytosis   Not Reportable 


 


Macrocytosis   Not Reportable 


 


Spherocytes   Not Reportable 


 


Pappenheimer Bodies   Not Reportable 


 


Sickle Cells   Not Reportable 


 


Target Cells   Not Reportable 


 


Tear Drop Cells   Not Reportable 


 


Ovalocytes   Not Reportable 


 


Helmet Cells   Not Reportable 


 


Leon-Bono Bodies   Not Reportable 


 


Cabot Rings   Not Reportable 


 


Swati Cells   1+ 


 


Bite Cells   Not Reportable 


 


Crenated Cell   Not Reportable 


 


Elliptocytes   Not Reportable 


 


Acanthocytes (Spur)   Not Reportable 


 


Rouleaux   Not Reportable 


 


Hemoglobin C Crystals   Not Reportable 


 


Schistocytes   Not Reportable 


 


Malaria parasites   Not Reportable 


 


Arnie Bodies   Not Reportable 


 


Hem Pathologist Commnt   No 


 


Sodium    123 L


 


Potassium    4.4


 


Chloride    93.7 L


 


Carbon Dioxide    19 L


 


Anion Gap    15


 


BUN    22 H


 


Creatinine    0.5 L D


 


Estimated GFR    > 60


 


BUN/Creatinine Ratio    44


 


Glucose    136 H


 


POC Glucose  107 H  


 


Calcium    8.2 L


 


Total Bilirubin    12.20 H


 


AST    102 H


 


ALT    57 H


 


Alkaline Phosphatase    296 H


 


Total Protein    5.3 L


 


Albumin    2.3 L


 


Albumin/Globulin Ratio    0.8

## 2022-01-11 NOTE — PROGRESS NOTE
Assessment and Plan





- Patient Problems


(1) Hyponatremia


Current Visit: Yes   Status: Acute   


Plan to address problem: 


Check urine osmolarity and serum osmolarity


Sodium improved from 112 to 116


3 percent NS --500 ml over 10 hours


Change from 124-123


Will try desmopressin








(2) Liver failure


Current Visit: Yes   Status: Chronic   


Qualifiers: 


   Liver failure chronicity: chronic   Hepatic coma status: without hepatic coma

  Qualified Code(s): K72.10 - Chronic hepatic failure without coma   


Plan to address problem: 


Patient has end-stage lung disease


Elevated total bili of 8.9 and direct bili of 3.5, AST is 118 and ALT is 58








(3) Hypotension


Current Visit: Yes   Status: Acute   


Qualifiers: 


   Hypotension type: unspecified hypotension type   Qualified Code(s): I95.9 - 

Hypotension, unspecified   


Plan to address problem: 


Patient is on Levophed at 20 mics 








(4) Cirrhosis of liver


Current Visit: Yes   Status: Chronic   


Qualifiers: 


   Ascites presence: without ascites 


Plan to address problem: 


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


Patient is a poor historian


Says he has no family











(5) Hypomagnesemia


Current Visit: Yes   Status: Acute   


Plan to address problem: 


Supplemented with IV magnesium


Corrected








(6) Polycythemia due to fall in plasma volume


Current Visit: Yes   Status: Acute   


Plan to address problem: 


Gentle hydration given the elevated BNP


Improved








(7) Malnutrition


Current Visit: Yes   Status: Chronic   


Qualifiers: 


   Protein-calorie malnutrition severity: moderate 


Plan to address problem: 


Dietary supplements requested








(8) Elevated brain natriuretic peptide (BNP) level


Current Visit: Yes   Status: Deleted   


Plan to address problem: 


Echocardiogram for ejection fraction and valve function








(9) DVT prophylaxis


Current Visit: Yes   Status: Acute   


Plan to address problem: 


SCDs and GI prophylaxis








Subjective


Date of service: 01/11/22


Principal diagnosis: Hyponatremia,Liver failure


Interval history: 





46-year-old male with history of CAD and liver failure in the past of unclear 

etiology and chronicity presents complaining of proximately 5 days of 

progressive dyspnea on exertion as well as lower extremity edema.  Patient is 

unable to identify the cause or the amount of time that he has had liver failure

but says this has been an ongoing problem and he has developed ascites in the 

past, however, the last time he needed a paracentesis was approximately 4 years 

ago.  He also says he has had subjective fever, mild dry cough, and intermittent

chest pain which she is unable to describe.  There are no known aggravating or 

alleviating factors.  He has not tried to take anything for his symptoms. he has

been taking his diuretics as prescribed. Denies any associated headache, vision 

change, neck pain, back pain, abdominal pain, nausea/vomiting, focal weakness, 

sensory changes, room spinning dizziness, dysuria, or any other complaints.  He 

is fully vaccinated against COVID-19.  He denies alcohol use.





1/9/22


Na112 to 116Sx better





1/10/2022


Sodium improved from 112 --116 yesterday and today it is 124


Patient is more alert


Patient has end-stage liver disease





1/11/2022


Sodium is 123


Patient on desmopressin and 3% saline x500 mL











Objective





- Constitutional


Vitals: 


                               Vital Signs - 12hr











  01/11/22 01/11/22 01/11/22





  11:53 18:24 18:25


 


Temperature 98.1 F 98.0 F 


 


Pulse Rate 85 82 85


 


Pulse Rate [   





From Monitor]   


 


Respiratory 17  





Rate   


 


Blood Pressure  123/91 


 


Blood Pressure 120/80  





[Left]   


 


O2 Sat by Pulse 95 77 L 99





Oximetry   














  01/11/22 01/11/22 01/11/22





  19:20 20:48 20:52


 


Temperature 97.5 F L  


 


Pulse Rate 86 86 


 


Pulse Rate [   74





From Monitor]   


 


Respiratory 18  





Rate   


 


Blood Pressure 118/83  


 


Blood Pressure   





[Left]   


 


O2 Sat by Pulse 97  97





Oximetry   














  01/11/22





  21:34


 


Temperature 


 


Pulse Rate 


 


Pulse Rate [ 





From Monitor] 


 


Respiratory 18





Rate 


 


Blood Pressure 


 


Blood Pressure 





[Left] 


 


O2 Sat by Pulse 





Oximetry 











General appearance: Present: no acute distress, well-nourished





- EENT


Eyes: PERRL, EOM intact


ENT: hearing intact, clear oral mucosa


Ears: bilateral: normal





- Neck


Neck: supple, normal ROM





- Respiratory


Respiratory effort: normal


Respiratory: bilateral: CTA





- Breasts


Breasts: normal





- Cardiovascular


Heart rate: 78


Rhythm: regular


Heart Sounds: Present: S1 & S2.  Absent: gallop, rub


Extremities: pulses intact, No edema, normal color, Full ROM





- Gastrointestinal


General gastrointestinal: Present: soft, non-tender, non-distended, normal bowel

 sounds





- Genitourinary


Male genitourinary: normal





- Integumentary


Integumentary: clear, warm, dry





- Musculoskeletal


Musculoskeletal: 1, strength equal bilaterally





- Neurologic


Neurologic: moves all extremities





- Psychiatric


Psychiatric: memory intact, appropriate mood/affect, intact judgment & insight





- Labs


CBC & Chem 7: 


                                 01/11/22 15:07





                                 01/11/22 15:07


Labs: 


                              Abnormal lab results











  01/11/22 01/11/22 01/11/22 Range/Units





  11:49 15:07 15:07 


 


Hgb   15.6 H   (11.8-15.2)  gm/dl


 


Hct   47.3 H   (35.5-45.6)  %


 


MCV   108 H   (84-94)  fl


 


MCH   36 H   (28-32)  pg


 


RDW   16.3 H   (13.2-15.2)  %


 


Plt Count   40 L   (140-440)  K/mm3


 


Seg Neuts % (Manual)   80.0 H   (40.0-70.0)  %


 


Lymphocytes % (Manual)   6.0 L   (13.4-35.0)  %


 


Monocytes % (Manual)   14.0 H   (0.0-7.3)  %


 


Seg Neutrophils # Man   7.8 H   (1.8-7.7)  K/mm3


 


Lymphocytes # (Manual)   0.6 L   (1.2-5.4)  K/mm3


 


Monocytes # (Manual)   1.4 H   (0.0-0.8)  K/mm3


 


Sodium    123 L  (137-145)  mmol/L


 


Chloride    93.7 L  ()  mmol/L


 


Carbon Dioxide    19 L  (22-30)  mmol/L


 


BUN    22 H  (9-20)  mg/dL


 


Creatinine    0.5 L D  (0.8-1.3)  mg/dL


 


Glucose    136 H  ()  mg/dL


 


POC Glucose  107 H    ()  mg/dL


 


Calcium    8.2 L  (8.4-10.2)  mg/dL


 


Total Bilirubin    12.20 H  (0.1-1.2)  mg/dL


 


AST    102 H  (5-40)  units/L


 


ALT    57 H  (7-56)  units/L


 


Alkaline Phosphatase    296 H  ()  units/L


 


Total Protein    5.3 L  (6.3-8.2)  g/dL


 


Albumin    2.3 L  (3.9-5)  g/dL














HEART Score





- HEART Score


Troponin: 


                                        











Troponin T  < 0.010 ng/mL (0.00-0.029)   01/08/22  12:34

## 2022-01-12 RX ADMIN — KETOROLAC TROMETHAMINE SCH: 30 INJECTION, SOLUTION INTRAMUSCULAR at 12:52

## 2022-01-12 RX ADMIN — KETOROLAC TROMETHAMINE SCH: 30 INJECTION, SOLUTION INTRAMUSCULAR at 18:33

## 2022-01-12 RX ADMIN — FAMOTIDINE SCH MG: 10 INJECTION, SOLUTION INTRAVENOUS at 21:30

## 2022-01-12 RX ADMIN — KETOROLAC TROMETHAMINE SCH: 30 INJECTION, SOLUTION INTRAMUSCULAR at 00:47

## 2022-01-12 RX ADMIN — Medication SCH ML: at 10:32

## 2022-01-12 RX ADMIN — Medication SCH ML: at 21:45

## 2022-01-12 RX ADMIN — KETOROLAC TROMETHAMINE SCH MG: 30 INJECTION, SOLUTION INTRAMUSCULAR at 23:18

## 2022-01-12 RX ADMIN — OXYCODONE AND ACETAMINOPHEN PRN TAB: 5; 325 TABLET ORAL at 04:04

## 2022-01-12 RX ADMIN — FAMOTIDINE SCH MG: 10 INJECTION, SOLUTION INTRAVENOUS at 10:32

## 2022-01-12 RX ADMIN — KETOROLAC TROMETHAMINE SCH: 30 INJECTION, SOLUTION INTRAMUSCULAR at 05:23

## 2022-01-12 NOTE — ELECTROCARDIOGRAPH REPORT
[facility]

                                       

Test Date:    2022               Test Time:    11:35:23

Pat Name:     ASHWIN CID             Department:   

Patient ID:   [id number]          Room:         A485

Gender:       M                        Technician:   DOYLE

:          1975               Requested By: TATO VASQUEZ

Order Number: [id number]              Reading MD:   Angie Alberto

                                 Measurements

Intervals                              Axis          

Rate:         79                       P:            67

DE:           158                      QRS:          150

QRSD:         100                      T:            66

QT:           402                                    

QTc:          460                                    

                           Interpretive Statements

Sinus rhythm

Probable left atrial enlargement

Right axis deviation, right bundle branch block

Compared to ECG 2022 13:19:07

Prolonged QT interval no longer present

Electronically Signed On 2022 8:48:18 EST by Angie Alberto

## 2022-01-12 NOTE — GASTROENTEROLOGY PROGRESS NOTE
Assessment and Plan


1. 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 

disease


- pt w/ ascites but no need for paracenthesis


- pt ammonia stable,


- diet as tolerated 


- Na management per primary team


- watch for signs possible alcohol withdrawal


- no changes at this time


- stable to dc when Na stable


- no plans GI intervention, will sign off, call if needed








Subjective


Date of service: 01/12/22


Principal diagnosis: Hyponatremia,Liver failure


Interval history: 


- no GI complaints overnight per staff, up and eating








Objective





- Constitutional


Vitals: 


                                        











Temp Pulse Resp BP Pulse Ox


 


 98.0 F   79   18   129/95   94 


 


 01/12/22 12:06  01/12/22 12:06  01/12/22 12:06  01/12/22 12:06  01/12/22 12:06











General appearance: no acute distress





- EENT


Eyes: PERRL





- Respiratory


Respiratory: bilateral: CTA





- Cardiovascular


Rhythm: regular


Heart Sounds: Present: S1 & S2





- Gastrointestinal


General gastrointestinal: Present: soft, non-tender, non-distended





- Labs


CBC & Chem 7: 


                                 01/11/22 15:07





                                 01/11/22 15:07

## 2022-01-12 NOTE — ELECTROCARDIOGRAPH REPORT
[facility]

                                       

Test Date:    2022               Test Time:    13:19:07

Pat Name:     ASHWIN CID             Department:   

Patient ID:   [id number]          Room:         A485

Gender:       M                        Technician:   SUDHA

:          1975               Requested By: TATO VASQUEZ

Order Number: [id number]              Reading MD:   Angie Alberto

                                 Measurements

Intervals                              Axis          

Rate:         83                       P:            61

AL:           147                      QRS:          137

QRSD:         100                      T:            37

QT:           473                                    

QTc:          556                                    

                           Interpretive Statements

Sinus rhythm

Biatrial enlargement

Right ventricular hypertrophy

Right bundle branch block

Prolonged QT interval

No previous ECG available for comparison

Electronically Signed On 2022 8:26:40 EST by Angie Alberto

## 2022-01-12 NOTE — PROGRESS NOTE
Assessment and Plan





46-year-old male with history of CAD and liver failure in the past of unclear 

etiology and chronicity presents complaining of proximately 5 days of 

progressive dyspnea on exertion as well as lower extremity edema.  Patient is 

unable to identify the cause or the amount of time that he has had liver failure

but says this has been an ongoing problem and he has developed ascites in the 

past, however, the last time he needed a paracentesis was approximately 4 years 

ago.  He also says he has had subjective fever, mild dry cough, and intermittent

chest pain which she is unable to describe.  There are no known aggravating or 

alleviating factors.  He has not tried to take anything for his symptoms. he has

been taking his diuretics as prescribed. Denies any associated headache, vision 

change, neck pain, back pain, abdominal pain, nausea/vomiting, focal weakness, 

sensory changes, room spinning dizziness, dysuria, or any other complaints.  He 

is fully vaccinated against COVID-19.  Patient has a history of smoking and 

alcohol use, stopped both of them several years ago. Worked with [employer].





Patient sleeping on room air . No acute respiratory distress. O2 saturation 

running 98%


Patient afebrile, no leukocytosis. /88 Pulse 79 Respirations 20





Chest xray on 01/08/22 reported Cardiomegaly without CHF


CT  scan of chest on 01/08/22 reported Cardiomegaly and pulmonary venous 

hypertension, Mild emphysema, Mild biliary ductal dilatation.   





Patient is on Famotidine    








- Patient Problems


(1) Heart failure


Current Visit: Yes   Status: Acute   


Plan to address problem: 


Management as for primary care and cardiology








(2) Hypotension


Current Visit: Yes   Status: Acute   


Qualifiers: 


   Hypotension type: unspecified hypotension type   Qualified Code(s): I95.9 - 

Hypotension, unspecified   


Plan to address problem: 


Blood pressure improved. 122/88








(3) Metabolic encephalopathy


Current Visit: Yes   Status: Acute   


Plan to address problem: 


Management as for primary care








(4) Thrombocytopenia


Current Visit: Yes   Status: Acute   


Plan to address problem: 


Management as for primary care and hematology








(5) Liver failure


Current Visit: Yes   Status: Chronic   


Qualifiers: 


   Liver failure chronicity: chronic   Hepatic coma status: without hepatic coma

  Qualified Code(s): K72.10 - Chronic hepatic failure without coma   


Plan to address problem: 


Management as for gastroenterology 








(6) Malnutrition


Current Visit: Yes   Status: Chronic   


Qualifiers: 


   Protein-calorie malnutrition severity: moderate 


Plan to address problem: 


Consult dietician 








(7) Hyponatremia


Current Visit: Yes   Status: Acute   


Plan to address problem: 


Patient's sodium is 123.  On IV normal saline. Management as for nephrology








Subjective


Date of service: 01/12/22


Principal diagnosis: Hyponatremia,Liver failure


Interval history: 





46-year-old male with history of CAD and liver failure in the past of unclear 

etiology and chronicity presents complaining of proximately 5 days of 

progressive dyspnea on exertion as well as lower extremity edema.  Patient is 

unable to identify the cause or the amount of time that he has had liver failure

but says this has been an ongoing problem and he has developed ascites in the 

past, however, the last time he needed a paracentesis was approximately 4 years 

ago.  He also says he has had subjective fever, mild dry cough, and intermittent

chest pain which she is unable to describe.  There are no known aggravating or 

alleviating factors.  He has not tried to take anything for his symptoms. he has

been taking his diuretics as prescribed. Denies any associated headache, vision 

change, neck pain, back pain, abdominal pain, nausea/vomiting, focal weakness, 

sensory changes, room spinning dizziness, dysuria, or any other complaints.  He 

is fully vaccinated against COVID-19.  Patient has a history of smoking and 

alcohol use, stopped both of them several years ago. Worked with [employer].





Patient sleeping on room air . No acute respiratory distress. O2 saturation 

running 98%


Patient afebrile, no leukocytosis. /88 Pulse 79 Respirations 20





Chest xray on 01/08/22 reported Cardiomegaly without CHF


CT  scan of chest on 01/08/22 reported Cardiomegaly and pulmonary venous 

hypertension, Mild emphysema, Mild biliary ductal dilatation.   





Patient is on Famotidine  











Objective


                               Vital Signs - 12hr











  01/12/22 01/12/22 01/12/22





  08:15 10:00 12:06


 


Temperature 97.3 F L  98.0 F


 


Pulse Rate 81 80 79


 


Respiratory 20  18





Rate   


 


Blood Pressure 160/105  129/95


 


O2 Sat by Pulse 96 96 94





Oximetry   














  01/12/22





  17:12


 


Temperature 122.0 F H


 


Pulse Rate 73


 


Respiratory 18





Rate 


 


Blood Pressure 177/111


 


O2 Sat by Pulse 96





Oximetry 











Constitutional: no acute distress, asleep, other


Eyes: icteric


ENT: oropharynx moist


Neck: supple, no lymphadenopathy


Effort: mildly labored


Ascultation: Bilateral: diminished breath sounds


Cardiovascular: regular rate and rhythm


Gastrointestinal: normoactive bowel sounds


Integumentary: normal


Extremities: no cyanosis, other (trace edema .)


Neurologic: non-focal exam, pupils equal and round


Psychiatric: depressed


CBC and BMP: 


                                 01/11/22 15:07





                                 01/11/22 15:07


ABG, PT/INR, D-dimer: 


PT/INR, D-dimer











PT  21.0 Sec. (12.2-14.9)  H  01/08/22  10:08    


 


INR  1.64  (0.87-1.13)  H  01/08/22  10:08    








Abnormal lab findings: 


                                  Abnormal Labs











  01/08/22 01/08/22 01/08/22





  10:08 10:08 10:08


 


WBC   


 


Hgb  17.6 H  


 


Hct  53.0 H  


 


MCV  107 H  


 


MCH  35 H  


 


RDW  15.7 H  


 


Plt Count  82 L  


 


[place] % (Auto)   


 


Seg Neuts % (Manual)  92.0 H  


 


Lymphocytes % (Manual)  4.0 L  


 


Monocytes % (Manual)   


 


Seg Neutrophils # Man   


 


Lymphocytes # (Manual)  0.3 L  


 


Monocytes # (Manual)   


 


PT   21.0 H 


 


INR   1.64 H 


 


Sodium   


 


Potassium   


 


Chloride   


 


Carbon Dioxide   


 


BUN   


 


Creatinine   


 


Glucose   


 


POC Glucose   


 


Lactic Acid   


 


Calcium   


 


Magnesium   


 


Total Bilirubin   


 


Direct Bilirubin   


 


AST   


 


ALT   


 


Alkaline Phosphatase   


 


NT-Pro-B Natriuret Pep    2571 H


 


Total Protein   


 


Albumin   


 


Salicylates   


 


Acetaminophen   














  01/08/22 01/08/22 01/08/22





  10:10 12:34 12:34


 


WBC   


 


Hgb   


 


Hct   


 


MCV   


 


MCH   


 


RDW   


 


Plt Count   


 


Mono % (Auto)   


 


Seg Neuts % (Manual)   


 


Lymphocytes % (Manual)   


 


Monocytes % (Manual)   


 


Seg Neutrophils # Man   


 


Lymphocytes # (Manual)   


 


Monocytes # (Manual)   


 


PT   


 


INR   


 


Sodium  112 L*  


 


Potassium   


 


Chloride  77.6 L  


 


Carbon Dioxide   


 


BUN   


 


Creatinine   


 


Glucose   


 


POC Glucose   


 


Lactic Acid   


 


Calcium  8.3 L  


 


Magnesium  1.60 L  


 


Total Bilirubin  8.90 H  


 


Direct Bilirubin  3.5 H  


 


AST  118 H  


 


ALT  58 H  


 


Alkaline Phosphatase  359 H  


 


NT-Pro-B Natriuret Pep   


 


Total Protein   


 


Albumin  2.9 L  


 


Salicylates   < 0.3 L 


 


Acetaminophen    5.0 L














  01/09/22 01/09/22 01/09/22





  04:58 04:58 19:50


 


WBC  11.9 H  


 


Hgb  16.8 H  


 


Hct  50.2 H  


 


MCV  107 H  


 


MCH  36 H  


 


RDW  15.9 H  


 


Plt Count  94 L  


 


[place] % (Auto)   


 


Seg Neuts % (Manual)  94.0 H  


 


Lymphocytes % (Manual)  5.0 L  


 


Monocytes % (Manual)   


 


Seg Neutrophils # Man  11.2 H  


 


Lymphocytes # (Manual)  0.6 L  


 


Monocytes # (Manual)   


 


PT   


 


INR   


 


Sodium   116 L* 


 


Potassium   5.7 H 


 


Chloride   81.4 L 


 


Carbon Dioxide   


 


BUN   21 H 


 


Creatinine   


 


Glucose   55 L 


 


POC Glucose   


 


Lactic Acid    2.90 H*


 


Calcium   8.3 L 


 


Magnesium   


 


Total Bilirubin   9.80 H 


 


Direct Bilirubin   


 


AST   129 H 


 


ALT   60 H 


 


Alkaline Phosphatase   303 H 


 


NT-Pro-B Natriuret Pep   


 


Total Protein   


 


Albumin   2.5 L 


 


Salicylates   


 


Acetaminophen   














  01/09/22 01/09/22 01/10/22





  22:21 22:21 05:20


 


WBC   


 


Hgb  15.6 H  


 


Hct  46.9 H  


 


MCV  109 H   108 H


 


MCH  36 H   36 H


 


RDW  16.1 H   16.0 H


 


Plt Count  66 L   67 L


 


Mono % (Auto)  13.6 H  


 


Seg Neuts % (Manual)    82.0 H


 


Lymphocytes % (Manual)    12.0 L


 


Monocytes % (Manual)   


 


Seg Neutrophils # Man   


 


Lymphocytes # (Manual)    1.0 L


 


Monocytes # (Manual)   


 


PT   


 


INR   


 


Sodium   120 L 


 


Potassium   5.6 H 


 


Chloride   89.1 L 


 


Carbon Dioxide   18 L 


 


BUN   28 H 


 


Creatinine   


 


Glucose   121 H 


 


POC Glucose   


 


Lactic Acid   


 


Calcium   7.5 L 


 


Magnesium   


 


Total Bilirubin   7.90 H 


 


Direct Bilirubin   


 


AST   115 H 


 


ALT   


 


Alkaline Phosphatase   304 H 


 


NT-Pro-B Natriuret Pep   


 


Total Protein   5.6 L 


 


Albumin   2.1 L 


 


Salicylates   


 


Acetaminophen   














  01/10/22 01/11/22 01/11/22





  05:20 11:49 15:07


 


WBC   


 


Hgb    15.6 H


 


Hct    47.3 H


 


MCV    108 H


 


MCH    36 H


 


RDW    16.3 H


 


Plt Count    40 L


 


Mono % (Auto)   


 


Seg Neuts % (Manual)    80.0 H


 


Lymphocytes % (Manual)    6.0 L


 


Monocytes % (Manual)    14.0 H


 


Seg Neutrophils # Man    7.8 H


 


Lymphocytes # (Manual)    0.6 L


 


Monocytes # (Manual)    1.4 H


 


PT   


 


INR   


 


Sodium  124 L  


 


Potassium   


 


Chloride  90.8 L  


 


Carbon Dioxide   


 


BUN  30 H  


 


Creatinine   


 


Glucose   


 


POC Glucose   107 H 


 


Lactic Acid   


 


Calcium  8.0 L  


 


Magnesium   


 


Total Bilirubin  6.70 H  


 


Direct Bilirubin   


 


AST  97 H  


 


ALT   


 


Alkaline Phosphatase  318 H  


 


NT-Pro-B Natriuret Pep   


 


Total Protein  5.1 L  


 


Albumin  2.2 L  


 


Salicylates   


 


Acetaminophen   














  01/11/22





  15:07


 


WBC 


 


Hgb 


 


Hct 


 


MCV 


 


MCH 


 


RDW 


 


Plt Count 


 


Mono % (Auto) 


 


Seg Neuts % (Manual) 


 


Lymphocytes % (Manual) 


 


Monocytes % (Manual) 


 


Seg Neutrophils # Man 


 


Lymphocytes # (Manual) 


 


Monocytes # (Manual) 


 


PT 


 


INR 


 


Sodium  123 L


 


Potassium 


 


Chloride  93.7 L


 


Carbon Dioxide  19 L


 


BUN  22 H


 


Creatinine  0.5 L D


 


Glucose  136 H


 


POC Glucose 


 


Lactic Acid 


 


Calcium  8.2 L


 


Magnesium 


 


Total Bilirubin  12.20 H


 


Direct Bilirubin 


 


AST  102 H


 


ALT  57 H


 


Alkaline Phosphatase  296 H


 


NT-Pro-B Natriuret Pep 


 


Total Protein  5.3 L


 


Albumin  2.3 L


 


Salicylates 


 


Acetaminophen

## 2022-01-13 LAB
ALBUMIN SERPL-MCNC: 2.3 G/DL (ref 3.9–5)
ALT SERPL-CCNC: 53 UNITS/L (ref 7–56)
BAND NEUTROPHILS # (MANUAL): 0.2 K/MM3
BUN SERPL-MCNC: 31 MG/DL (ref 9–20)
BUN SERPL-MCNC: 33 MG/DL (ref 9–20)
BUN/CREAT SERPL: 39 %
BUN/CREAT SERPL: 41 %
BURR CELLS BLD QL SMEAR: (no result)
CALCIUM SERPL-MCNC: 9 MG/DL (ref 8.4–10.2)
CALCIUM SERPL-MCNC: 9.2 MG/DL (ref 8.4–10.2)
HCT VFR BLD CALC: 51.5 % (ref 35.5–45.6)
HEMOLYSIS INDEX: 251
HEMOLYSIS INDEX: 345
HGB BLD-MCNC: 16.7 GM/DL (ref 11.8–15.2)
MCHC RBC AUTO-ENTMCNC: 33 % (ref 32–34)
MCV RBC AUTO: 111 FL (ref 84–94)
MYELOCYTES # (MANUAL): 0.2 K/MM3
PLATELET # BLD: 60 K/MM3 (ref 140–440)
POIKILOCYTOSIS BLD QL SMEAR: (no result)
PROMYELOCYTES # (MANUAL): 0 K/MM3
RBC # BLD AUTO: 4.66 M/MM3 (ref 3.65–5.03)
TARGETS BLD QL SMEAR: (no result)
TOTAL CELLS COUNTED BLD: 100

## 2022-01-13 RX ADMIN — KETOROLAC TROMETHAMINE SCH: 30 INJECTION, SOLUTION INTRAMUSCULAR at 06:09

## 2022-01-13 RX ADMIN — DESMOPRESSIN ACETATE SCH MCG: 4 INJECTION INTRAVENOUS; SUBCUTANEOUS at 22:13

## 2022-01-13 RX ADMIN — DESMOPRESSIN ACETATE SCH MCG: 4 INJECTION INTRAVENOUS; SUBCUTANEOUS at 12:00

## 2022-01-13 RX ADMIN — DESMOPRESSIN ACETATE SCH MCG: 4 INJECTION INTRAVENOUS; SUBCUTANEOUS at 00:35

## 2022-01-13 RX ADMIN — FAMOTIDINE SCH MG: 10 INJECTION, SOLUTION INTRAVENOUS at 10:01

## 2022-01-13 RX ADMIN — FAMOTIDINE SCH MG: 10 INJECTION, SOLUTION INTRAVENOUS at 22:10

## 2022-01-13 RX ADMIN — KETOROLAC TROMETHAMINE SCH: 30 INJECTION, SOLUTION INTRAMUSCULAR at 12:00

## 2022-01-13 NOTE — PROGRESS NOTE
Assessment and Plan








End-stage liver disease (decompensated)


Severe hyponatremia


Hypotension


Possible liver cirrhosis


Acute exacerbation of congestive heart failure


Obesity


CAD


Thrombocytopenia


Elevated serum transaminases


Hyperbilirubinemia


Hyperkalemia





- free water restriction


- trend LFT's / Billirubin


- follow platelet count


- hematology consultation





- prn supplemental oxygen to keep O2 sats > 90%


- prn bronchodilators (YOEL) with pulm hygiene per RT


- continue to avoid hepatotoxins & nephrotoxins, renally dose all medications


- prn analgesia per pain score


- mobility protocols to prevent pressure ulcers


- PT/OT as tolerated


- Wound care per RN/WCT


- continue accuchecks with glycemic control per SSI for target blood glucose < 

180 mg/dL 


- tobacco & alcohol strongly counseled at the bedside  


- home oxygen evaluation at discharge


- GI & VTE prophylaxis


- Flu & pneumovax per protocol


- continue other care per attending / other consultants





... re-evaluate in am & prn





Subjective


Date of service: 01/13/22


Principal diagnosis: ESLD; Hyponatremia; AE-CHF; Obesity; CAD; Thrombocytopenia;

Hyperkalemia


Interval history: 





Patient is seen today for: ESLD;  Hyponatremia; Hypotension; AE-CHF; Obesity; 

CAD; Thrombocytopenia; Hyperbilirubinemia; Hyperkalemia





Seen and examined at bedside; 24hour events reviewed; nursing and respiratory 

care staff consulted; no adverse overnight events reported to me; resting 

peacefully in bed; remains on supplemental oxygen at 3L flow; no gross bleeding





Objective


                               Vital Signs - 12hr











  01/13/22 01/13/22





  03:25 07:36


 


Temperature 97.2 F L 97.8 F


 


Pulse Rate 80 84


 


Respiratory 19 20





Rate  


 


Blood Pressure 140/83 119/80


 


O2 Sat by Pulse 95 94





Oximetry  











Constitutional: no acute distress, other (mildly increased respiratory effort at

rest)


Eyes: icteric


ENT: oropharynx moist


Neck: supple, no lymphadenopathy


Effort: mildly labored


Ascultation: Bilateral: diminished breath sounds


Percussion: Bilateral: not dull


Cardiovascular: regular rate and rhythm


Gastrointestinal: normoactive bowel sounds, soft, non-tender, non-distended 

(protuberant)


Integumentary: erythema


Extremities: no cyanosis, pink and warm, pulses normal, edema (2+)


Neurologic: non-focal exam (grossly), pupils equal and round, CN II-XII normal, 

other (lethargic)


Psychiatric: depressed


CBC and BMP: 


                                 01/14/22 08:20





                                 01/14/22 08:20


ABG, PT/INR, D-dimer: 


PT/INR, D-dimer











PT  21.0 Sec. (12.2-14.9)  H  01/08/22  10:08    


 


INR  1.64  (0.87-1.13)  H  01/08/22  10:08    








Abnormal lab findings: 


                                  Abnormal Labs











  01/08/22 01/08/22 01/08/22





  10:08 10:08 10:08


 


WBC   


 


Hgb  17.6 H  


 


Hct  53.0 H  


 


MCV  107 H  


 


MCH  35 H  


 


RDW  15.7 H  


 


Plt Count  82 L  


 


[place] % (Auto)   


 


Seg Neuts % (Manual)  92.0 H  


 


Lymphocytes % (Manual)  4.0 L  


 


Monocytes % (Manual)   


 


Seg Neutrophils # Man   


 


Lymphocytes # (Manual)  0.3 L  


 


Monocytes # (Manual)   


 


PT   21.0 H 


 


INR   1.64 H 


 


Sodium   


 


Potassium   


 


Chloride   


 


Carbon Dioxide   


 


BUN   


 


Creatinine   


 


Glucose   


 


POC Glucose   


 


Lactic Acid   


 


Calcium   


 


Magnesium   


 


Total Bilirubin   


 


Direct Bilirubin   


 


AST   


 


ALT   


 


Alkaline Phosphatase   


 


NT-Pro-B Natriuret Pep    2571 H


 


Total Protein   


 


Albumin   


 


Salicylates   


 


Acetaminophen   














  01/08/22 01/08/22 01/08/22





  10:10 12:34 12:34


 


WBC   


 


Hgb   


 


Hct   


 


MCV   


 


MCH   


 


RDW   


 


Plt Count   


 


Mono % (Auto)   


 


Seg Neuts % (Manual)   


 


Lymphocytes % (Manual)   


 


Monocytes % (Manual)   


 


Seg Neutrophils # Man   


 


Lymphocytes # (Manual)   


 


Monocytes # (Manual)   


 


PT   


 


INR   


 


Sodium  112 L*  


 


Potassium   


 


Chloride  77.6 L  


 


Carbon Dioxide   


 


BUN   


 


Creatinine   


 


Glucose   


 


POC Glucose   


 


Lactic Acid   


 


Calcium  8.3 L  


 


Magnesium  1.60 L  


 


Total Bilirubin  8.90 H  


 


Direct Bilirubin  3.5 H  


 


AST  118 H  


 


ALT  58 H  


 


Alkaline Phosphatase  359 H  


 


NT-Pro-B Natriuret Pep   


 


Total Protein   


 


Albumin  2.9 L  


 


Salicylates   < 0.3 L 


 


Acetaminophen    5.0 L














  01/09/22 01/09/22 01/09/22





  04:58 04:58 19:50


 


WBC  11.9 H  


 


Hgb  16.8 H  


 


Hct  50.2 H  


 


MCV  107 H  


 


MCH  36 H  


 


RDW  15.9 H  


 


Plt Count  94 L  


 


[place] % (Auto)   


 


Seg Neuts % (Manual)  94.0 H  


 


Lymphocytes % (Manual)  5.0 L  


 


Monocytes % (Manual)   


 


Seg Neutrophils # Man  11.2 H  


 


Lymphocytes # (Manual)  0.6 L  


 


Monocytes # (Manual)   


 


PT   


 


INR   


 


Sodium   116 L* 


 


Potassium   5.7 H 


 


Chloride   81.4 L 


 


Carbon Dioxide   


 


BUN   21 H 


 


Creatinine   


 


Glucose   55 L 


 


POC Glucose   


 


Lactic Acid    2.90 H*


 


Calcium   8.3 L 


 


Magnesium   


 


Total Bilirubin   9.80 H 


 


Direct Bilirubin   


 


AST   129 H 


 


ALT   60 H 


 


Alkaline Phosphatase   303 H 


 


NT-Pro-B Natriuret Pep   


 


Total Protein   


 


Albumin   2.5 L 


 


Salicylates   


 


Acetaminophen   














  01/09/22 01/09/22 01/10/22





  22:21 22:21 05:20


 


WBC   


 


Hgb  15.6 H  


 


Hct  46.9 H  


 


MCV  109 H   108 H


 


MCH  36 H   36 H


 


RDW  16.1 H   16.0 H


 


Plt Count  66 L   67 L


 


[place]o % (Auto)  13.6 H  


 


Seg Neuts % (Manual)    82.0 H


 


Lymphocytes % (Manual)    12.0 L


 


Monocytes % (Manual)   


 


Seg Neutrophils # Man   


 


Lymphocytes # (Manual)    1.0 L


 


Monocytes # (Manual)   


 


PT   


 


INR   


 


Sodium   120 L 


 


Potassium   5.6 H 


 


Chloride   89.1 L 


 


Carbon Dioxide   18 L 


 


BUN   28 H 


 


Creatinine   


 


Glucose   121 H 


 


POC Glucose   


 


Lactic Acid   


 


Calcium   7.5 L 


 


Magnesium   


 


Total Bilirubin   7.90 H 


 


Direct Bilirubin   


 


AST   115 H 


 


ALT   


 


Alkaline Phosphatase   304 H 


 


NT-Pro-B Natriuret Pep   


 


Total Protein   5.6 L 


 


Albumin   2.1 L 


 


Salicylates   


 


Acetaminophen   














  01/10/22 01/11/22 01/11/22





  05:20 11:49 15:07


 


WBC   


 


Hgb    15.6 H


 


Hct    47.3 H


 


MCV    108 H


 


MCH    36 H


 


RDW    16.3 H


 


Plt Count    40 L


 


Mono % (Auto)   


 


Seg Neuts % (Manual)    80.0 H


 


Lymphocytes % (Manual)    6.0 L


 


Monocytes % (Manual)    14.0 H


 


Seg Neutrophils # Man    7.8 H


 


Lymphocytes # (Manual)    0.6 L


 


Monocytes # (Manual)    1.4 H


 


PT   


 


INR   


 


Sodium  124 L  


 


Potassium   


 


Chloride  90.8 L  


 


Carbon Dioxide   


 


BUN  30 H  


 


Creatinine   


 


Glucose   


 


POC Glucose   107 H 


 


Lactic Acid   


 


Calcium  8.0 L  


 


Magnesium   


 


Total Bilirubin  6.70 H  


 


Direct Bilirubin   


 


AST  97 H  


 


ALT   


 


Alkaline Phosphatase  318 H  


 


NT-Pro-B Natriuret Pep   


 


Total Protein  5.1 L  


 


Albumin  2.2 L  


 


Salicylates   


 


Acetaminophen   














  01/11/22





  15:07


 


WBC 


 


Hgb 


 


Hct 


 


MCV 


 


MCH 


 


RDW 


 


Plt Count 


 


Mono % (Auto) 


 


Seg Neuts % (Manual) 


 


Lymphocytes % (Manual) 


 


Monocytes % (Manual) 


 


Seg Neutrophils # Man 


 


Lymphocytes # (Manual) 


 


Monocytes # (Manual) 


 


PT 


 


INR 


 


Sodium  123 L


 


Potassium 


 


Chloride  93.7 L


 


Carbon Dioxide  19 L


 


BUN  22 H


 


Creatinine  0.5 L D


 


Glucose  136 H


 


POC Glucose 


 


Lactic Acid 


 


Calcium  8.2 L


 


Magnesium 


 


Total Bilirubin  12.20 H


 


Direct Bilirubin 


 


AST  102 H


 


ALT  57 H


 


Alkaline Phosphatase  296 H


 


NT-Pro-B Natriuret Pep 


 


Total Protein  5.3 L


 


Albumin  2.3 L


 


Salicylates 


 


Acetaminophen 











Allied health notes reviewed: nursing

## 2022-01-13 NOTE — PROGRESS NOTE
Assessment and Plan





- Patient Problems


(1) Hyponatremia


Current Visit: Yes   Status: Acute   


Plan to address problem: 


Check urine osmolarity and serum osmolarity


Sodium improved from 112 to 116


3 percent NS --500 ml over 10 hours


Change from 124-123


Will try desmopressin








(2) Liver failure


Current Visit: Yes   Status: Chronic   


Qualifiers: 


   Liver failure chronicity: chronic   Hepatic coma status: without hepatic coma

  Qualified Code(s): K72.10 - Chronic hepatic failure without coma   


Plan to address problem: 


Patient has end-stage lung disease


Elevated total bili of 8.9 and direct bili of 3.5, AST is 118 and ALT is 58








(3) Hypotension


Current Visit: Yes   Status: Acute   


Qualifiers: 


   Hypotension type: unspecified hypotension type   Qualified Code(s): I95.9 - 

Hypotension, unspecified   


Plan to address problem: 


Patient is on Levophed at 20 mics 








(4) Cirrhosis of liver


Current Visit: Yes   Status: Chronic   


Qualifiers: 


   Ascites presence: without ascites 


Plan to address problem: 


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


Patient is a poor historian


Says he has no family











(5) Hypomagnesemia


Current Visit: Yes   Status: Acute   


Plan to address problem: 


Supplemented with IV magnesium


Corrected








(6) Polycythemia due to fall in plasma volume


Current Visit: Yes   Status: Acute   


Plan to address problem: 


Gentle hydration given the elevated BNP


Improved








(7) Malnutrition


Current Visit: Yes   Status: Chronic   


Qualifiers: 


   Protein-calorie malnutrition severity: moderate 


Plan to address problem: 


Dietary supplements requested








(8) Elevated brain natriuretic peptide (BNP) level


Current Visit: Yes   Status: Deleted   


Plan to address problem: 


Echocardiogram for ejection fraction and valve function








(9) DVT prophylaxis


Current Visit: Yes   Status: Acute   


Plan to address problem: 


SCDs and GI prophylaxis








Subjective


Date of service: 01/12/22


Principal diagnosis: Hyponatremia,Liver failure


Interval history: 





46-year-old male with history of CAD and liver failure in the past of unclear 

etiology and chronicity presents complaining of proximately 5 days of 

progressive dyspnea on exertion as well as lower extremity edema.  Patient is 

unable to identify the cause or the amount of time that he has had liver failure

but says this has been an ongoing problem and he has developed ascites in the 

past, however, the last time he needed a paracentesis was approximately 4 years 

ago.  He also says he has had subjective fever, mild dry cough, and intermittent

chest pain which she is unable to describe.  There are no known aggravating or 

alleviating factors.  He has not tried to take anything for his symptoms. he has

been taking his diuretics as prescribed. Denies any associated headache, vision 

change, neck pain, back pain, abdominal pain, nausea/vomiting, focal weakness, 

sensory changes, room spinning dizziness, dysuria, or any other complaints.  He 

is fully vaccinated against COVID-19.  He denies alcohol use.





1/9/22


Na112 to 116Sx better





1/10/2022


Sodium improved from 112 --116 yesterday and today it is 124


Patient is more alert


Patient has end-stage liver disease





1/11/2022


Sodium is 123


Patient on desmopressin and 3% saline x500 mL











Objective





- Constitutional


Vitals: 


                               Vital Signs - 12hr











  01/13/22 01/13/22





  03:25 07:36


 


Temperature 97.2 F L 97.8 F


 


Pulse Rate 80 84


 


Respiratory 19 20





Rate  


 


Blood Pressure 140/83 119/80


 


O2 Sat by Pulse 95 94





Oximetry  











General appearance: Present: no acute distress, well-nourished





- EENT


Eyes: PERRL, EOM intact


ENT: hearing intact, clear oral mucosa


Ears: bilateral: normal





- Neck


Neck: supple, normal ROM





- Respiratory


Respiratory effort: normal


Respiratory: bilateral: CTA





- Breasts


Breasts: normal





- Cardiovascular


Rhythm: regular


Heart Sounds: Present: S1 & S2.  Absent: gallop, rub


Extremities: pulses intact, No edema, normal color, Full ROM





- Gastrointestinal


General gastrointestinal: Present: soft, non-tender, non-distended, normal bowel

sounds





- Genitourinary


Male genitourinary: normal





- Integumentary


Integumentary: clear, warm, dry





- Musculoskeletal


Musculoskeletal: 1, strength equal bilaterally





- Neurologic


Neurologic: moves all extremities





- Psychiatric


Psychiatric: memory intact, appropriate mood/affect, intact judgment & insight





- Labs


CBC & Chem 7: 


                                 01/11/22 15:07





                                 01/11/22 15:07





HEART Score





- HEART Score


Troponin: 


                                        











Troponin T  < 0.010 ng/mL (0.00-0.029)   01/08/22  12:34

## 2022-01-13 NOTE — PROGRESS NOTE
Assessment and Plan





- Patient Problems


(1) Hyponatremia


Current Visit: Yes   Status: Acute   


Plan to address problem: 


Check urine osmolarity and serum osmolarity


Sodium improved from 112 to 116


3 percent NS --500 ml over 10 hours


Change from 124-123


Will try desmopressin








(2) Liver failure


Current Visit: Yes   Status: Chronic   


Qualifiers: 


   Liver failure chronicity: chronic   Hepatic coma status: without hepatic coma

  Qualified Code(s): K72.10 - Chronic hepatic failure without coma   


Plan to address problem: 


Patient has end-stage lung disease


Elevated total bili of 8.9 and direct bili of 3.5, AST is 118 and ALT is 58








(3) Hypotension


Current Visit: Yes   Status: Acute   


Qualifiers: 


   Hypotension type: unspecified hypotension type   Qualified Code(s): I95.9 - 

Hypotension, unspecified   


Plan to address problem: 


Patient is on Levophed at 20 mics 








(4) Cirrhosis of liver


Current Visit: Yes   Status: Chronic   


Qualifiers: 


   Ascites presence: without ascites 


Plan to address problem: 


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


Patient is a poor historian


Says he has no family











(5) Hypomagnesemia


Current Visit: Yes   Status: Acute   


Plan to address problem: 


Supplemented with IV magnesium


Corrected








(6) Polycythemia due to fall in plasma volume


Current Visit: Yes   Status: Acute   


Plan to address problem: 


Gentle hydration given the elevated BNP


Improved








(7) Malnutrition


Current Visit: Yes   Status: Chronic   


Qualifiers: 


   Protein-calorie malnutrition severity: moderate 


Plan to address problem: 


Dietary supplements requested








(8) Elevated brain natriuretic peptide (BNP) level


Current Visit: Yes   Status: Deleted   


Plan to address problem: 


Echocardiogram for ejection fraction and valve function








(9) DVT prophylaxis


Current Visit: Yes   Status: Acute   





Subjective


Date of service: 01/13/22


Principal diagnosis: Hyponatremia,Liver failure


Interval history: 





46-year-old male with history of CAD and liver failure in the past of unclear 

etiology and chronicity presents complaining of proximately 5 days of 

progressive dyspnea on exertion as well as lower extremity edema.  Patient is 

unable to identify the cause or the amount of time that he has had liver failure

but says this has been an ongoing problem and he has developed ascites in the 

past, however, the last time he needed a paracentesis was approximately 4 years 

ago.  He also says he has had subjective fever, mild dry cough, and intermittent

chest pain which she is unable to describe.  There are no known aggravating or 

alleviating factors.  He has not tried to take anything for his symptoms. he has

been taking his diuretics as prescribed. Denies any associated headache, vision 

change, neck pain, back pain, abdominal pain, nausea/vomiting, focal weakness, 

sensory changes, room spinning dizziness, dysuria, or any other complaints.  He 

is fully vaccinated against COVID-19.  He denies alcohol use.





1/9/22


Na112 to 116Sx better





1/10/2022


Sodium improved from 112 --116 yesterday and today it is 124


Patient is more alert


Patient has end-stage liver disease





1/11/2022


Sodium is 123


Patient on desmopressin and 3% saline x500 mL











Objective





- Constitutional


Vitals: 


                               Vital Signs - 12hr











  01/13/22 01/13/22





  03:25 07:36


 


Temperature 97.2 F L 97.8 F


 


Pulse Rate 80 84


 


Respiratory 19 20





Rate  


 


Blood Pressure 140/83 119/80


 


O2 Sat by Pulse 95 94





Oximetry  











General appearance: Present: no acute distress, well-nourished





- EENT


Eyes: PERRL, EOM intact


ENT: hearing intact, clear oral mucosa


Ears: bilateral: normal





- Neck


Neck: supple, normal ROM





- Respiratory


Respiratory effort: normal


Respiratory: bilateral: CTA





- Breasts


Breasts: normal





- Cardiovascular


Rhythm: regular


Heart Sounds: Present: S1 & S2.  Absent: gallop, rub


Extremities: pulses intact, No edema, normal color, Full ROM





- Gastrointestinal


General gastrointestinal: Present: soft, non-tender, non-distended, normal bowel

sounds





- Genitourinary


Male genitourinary: normal





- Integumentary


Integumentary: clear, warm, dry





- Musculoskeletal


Musculoskeletal: 1, strength equal bilaterally





- Neurologic


Neurologic: moves all extremities





- Psychiatric


Psychiatric: memory intact, appropriate mood/affect, intact judgment & insight





- Labs


CBC & Chem 7: 


                                 01/11/22 15:07





                                 01/11/22 15:07





HEART Score





- HEART Score


Troponin: 


                                        











Troponin T  < 0.010 ng/mL (0.00-0.029)   01/08/22  12:34

## 2022-01-14 LAB
ALBUMIN SERPL-MCNC: 2.4 G/DL (ref 3.9–5)
ALT SERPL-CCNC: 75 UNITS/L (ref 7–56)
BAND NEUTROPHILS # (MANUAL): 0.3 K/MM3
BUN SERPL-MCNC: 39 MG/DL (ref 9–20)
BUN SERPL-MCNC: 45 MG/DL (ref 9–20)
BUN/CREAT SERPL: 32 %
BUN/CREAT SERPL: 33 %
CALCIUM SERPL-MCNC: 9.2 MG/DL (ref 8.4–10.2)
CALCIUM SERPL-MCNC: 9.3 MG/DL (ref 8.4–10.2)
HCT VFR BLD CALC: 53.6 % (ref 35.5–45.6)
HEMOLYSIS INDEX: 29
HEMOLYSIS INDEX: 85
HGB BLD-MCNC: 17.2 GM/DL (ref 11.8–15.2)
MACROCYTES BLD QL SMEAR: (no result)
MCHC RBC AUTO-ENTMCNC: 32 % (ref 32–34)
MCV RBC AUTO: 111 FL (ref 84–94)
MYELOCYTES # (MANUAL): 0 K/MM3
PLATELET # BLD: 26 K/MM3 (ref 140–440)
PROMYELOCYTES # (MANUAL): 0 K/MM3
RBC # BLD AUTO: 4.81 M/MM3 (ref 3.65–5.03)
TARGETS BLD QL SMEAR: (no result)
TOTAL CELLS COUNTED BLD: 100

## 2022-01-14 RX ADMIN — KETOROLAC TROMETHAMINE SCH: 30 INJECTION, SOLUTION INTRAMUSCULAR at 22:32

## 2022-01-14 RX ADMIN — KETOROLAC TROMETHAMINE SCH: 30 INJECTION, SOLUTION INTRAMUSCULAR at 01:15

## 2022-01-14 RX ADMIN — SODIUM CHLORIDE TAB 1 GM SCH GM: 1 TAB at 22:40

## 2022-01-14 RX ADMIN — Medication PRN ML: at 22:41

## 2022-01-14 RX ADMIN — FAMOTIDINE SCH MG: 20 TABLET ORAL at 22:39

## 2022-01-14 RX ADMIN — SODIUM BICARBONATE SCH MLS/HR: 84 INJECTION, SOLUTION INTRAVENOUS at 23:48

## 2022-01-14 RX ADMIN — DESMOPRESSIN ACETATE SCH: 4 INJECTION INTRAVENOUS; SUBCUTANEOUS at 22:35

## 2022-01-14 RX ADMIN — FAMOTIDINE SCH MG: 10 INJECTION, SOLUTION INTRAVENOUS at 08:39

## 2022-01-14 RX ADMIN — FAMOTIDINE SCH: 10 INJECTION, SOLUTION INTRAVENOUS at 22:35

## 2022-01-14 NOTE — XRAY REPORT
ABDOMEN 1 VIEW



INDICATION / CLINICAL INFORMATION:

dobhoff placement.



COMPARISON: 

None available.



FINDINGS:



TUBES / LINES: An esophagogastric tube terminates over the gastric fundus. The sidehole of the tube p
rojects over the GE junction.

BOWEL GAS PATTERN: No significant abnormality. 

FREE AIR / EXTRALUMINAL GAS: None seen.



ADDITIONAL FINDINGS: No significant additional findings.



IMPRESSION:

Esophagogastric tube as above. Advancement of the tube by 4 cm should result in the sidehole being lo
cated in the stomach. If transpyloric location of the tip of the tube is desired, advancement of the 
tube by 18 cm is suggested.



Signer Name: Bi Matthews MD 

Signed: 1/14/2022 4:18 PM

Workstation Name: [email or web address]

## 2022-01-14 NOTE — PROGRESS NOTE
Assessment and Plan








End-stage liver disease (decompensated)


Severe hyponatremia


Hypotension


Possible liver cirrhosis


Acute exacerbation of congestive heart failure


Obesity


CAD


Thrombocytopenia


Elevated serum transaminases


Hyperbilirubinemia


Hyperkalemia





- 1 amp D50


- 10 units IV insulin


- 1 AMP NaHCO3


- 12 lead EKG (reviewed and addressed; no critical changes)


- received calcium earlier


- stat nephrology consult


- place FHT + KUB


- continue care as below otherwise;





- continue accuchecks with glycemic control per SSI (While critically ill target

blood glucose of 140-180 mg/dL; avoid hypoglycemia)


- continue to wean supplemental oxygen for target O2 sat's > 90% acutely


- aspiration precautions


- prn bronchodilators (YOEL) with pulm hygiene per RT


- wean per pulmonary driven protocols otherwise


- avoid nephrotoxins, renally dose all medications


- AB's per ID rec's 


- prn analgesia per CPOT score


- Maintenance of sleep-wake cycle, avoid delirium


- continue enteral nutritional support at goal rate as tolerated


- G.I. & VTE prophylaxis


- PT/OT/ROM exercises


- continue mobility protocols for pressure ulcer prophylaxis


- Monitor hemodynamics closely


- continue other care per attending / other consultants


- discharge planning ongoing concurrently





COVID SPECIFIC INTERVENTIONS


- COVID-19 test negative 





.... Re-evaluate in am & prn





CONDITION: CRITICAL


PROGNOSIS: GUARDED


CODE STATUS: FULL CODE





The high probability of a clinically significant, sudden or life-threatening 

deterioration of the [respiratory, cardiovascular, GI & renal] system(s) 

required my full and direct attention, intervention and personal management. The

aggregate critical care time was [35] minutes without overlap. Time includes 

spent on;





[x] Data Review and interpretation 





[x] Patient assessment and monitoring of vital signs 





[x] Documentation 





[x] Medication orders and management





Subjective


Date of service: 01/14/22


Principal diagnosis: ESLD; Hyponatremia; AE-CHF; Obesity; CAD; Thrombocytopenia;

 Hyperkalemia


Interval history: 





Patient is seen today for: ESLD;  Hyponatremia; Hypotension; AE-CHF; Obesity; 

CAD; Thrombocytopenia; Hyperbilirubinemia; Hyperkalemia





Seen and examined at bedside; 24hour events reviewed; nursing and respiratory 

care staff consulted; no adverse overnight events reported to me; resting 

peacefully in bed; lethargic; no emesis or overt aspiration





Objective


                               Vital Signs - 12hr











  01/14/22 01/14/22





  03:30 08:47


 


Temperature 97.2 F L 98.7 F


 


Pulse Rate 74 74


 


Respiratory 20 16





Rate  


 


Blood Pressure 145/75 124/74


 


O2 Sat by Pulse 100 100





Oximetry  











Constitutional: no acute distress, other (mildly increased respiratory effort at

 rest)


Eyes: icteric


ENT: oropharynx moist


Neck: supple, no lymphadenopathy


Effort: mildly labored


Ascultation: Bilateral: diminished breath sounds


Percussion: Bilateral: not dull


Cardiovascular: regular rate and rhythm


Gastrointestinal: normoactive bowel sounds, soft, non-tender, non-distended 

(protuberant)


Integumentary: erythema


Extremities: no cyanosis, pink and warm, pulses normal, edema (2+)


Neurologic: non-focal exam (grossly), pupils equal and round, CN II-XII normal, 

other (lethargic)


Psychiatric: depressed


CBC and BMP: 


                                 01/14/22 08:20





                                 01/14/22 21:03


ABG, PT/INR, D-dimer: 


PT/INR, D-dimer











PT  21.0 Sec. (12.2-14.9)  H  01/08/22  10:08    


 


INR  1.64  (0.87-1.13)  H  01/08/22  10:08    








Abnormal lab findings: 


                                  Abnormal Labs











  01/08/22 01/08/22 01/08/22





  10:08 10:08 10:08


 


WBC   


 


Hgb  17.6 H  


 


Hct  53.0 H  


 


MCV  107 H  


 


MCH  35 H  


 


RDW  15.7 H  


 


Plt Count  82 L  


 


[place] % (Auto)   


 


Seg Neuts % (Manual)  92.0 H  


 


Lymphocytes % (Manual)  4.0 L  


 


Monocytes % (Manual)   


 


Seg Neutrophils # Man   


 


Lymphocytes # (Manual)  0.3 L  


 


Monocytes # (Manual)   


 


PT   21.0 H 


 


INR   1.64 H 


 


Sodium   


 


Potassium   


 


Chloride   


 


Carbon Dioxide   


 


BUN   


 


Creatinine   


 


Glucose   


 


POC Glucose   


 


Lactic Acid   


 


Calcium   


 


Phosphorus   


 


Magnesium   


 


Total Bilirubin   


 


Direct Bilirubin   


 


AST   


 


ALT   


 


Alkaline Phosphatase   


 


NT-Pro-B Natriuret Pep    2571 H


 


Total Protein   


 


Albumin   


 


Salicylates   


 


Acetaminophen   














  01/08/22 01/08/22 01/08/22





  10:10 12:34 12:34


 


WBC   


 


Hgb   


 


Hct   


 


MCV   


 


MCH   


 


RDW   


 


Plt Count   


 


Mono % (Auto)   


 


Seg Neuts % (Manual)   


 


Lymphocytes % (Manual)   


 


Monocytes % (Manual)   


 


Seg Neutrophils # Man   


 


Lymphocytes # (Manual)   


 


Monocytes # (Manual)   


 


PT   


 


INR   


 


Sodium  112 L*  


 


Potassium   


 


Chloride  77.6 L  


 


Carbon Dioxide   


 


BUN   


 


Creatinine   


 


Glucose   


 


POC Glucose   


 


Lactic Acid   


 


Calcium  8.3 L  


 


Phosphorus   


 


Magnesium  1.60 L  


 


Total Bilirubin  8.90 H  


 


Direct Bilirubin  3.5 H  


 


AST  118 H  


 


ALT  58 H  


 


Alkaline Phosphatase  359 H  


 


NT-Pro-B Natriuret Pep   


 


Total Protein   


 


Albumin  2.9 L  


 


Salicylates   < 0.3 L 


 


Acetaminophen    5.0 L














  01/09/22 01/09/22 01/09/22





  04:58 04:58 19:50


 


WBC  11.9 H  


 


Hgb  16.8 H  


 


Hct  50.2 H  


 


MCV  107 H  


 


MCH  36 H  


 


RDW  15.9 H  


 


Plt Count  94 L  


 


[place] % (Auto)   


 


Seg Neuts % (Manual)  94.0 H  


 


Lymphocytes % (Manual)  5.0 L  


 


Monocytes % (Manual)   


 


Seg Neutrophils # Man  11.2 H  


 


Lymphocytes # (Manual)  0.6 L  


 


Monocytes # (Manual)   


 


PT   


 


INR   


 


Sodium   116 L* 


 


Potassium   5.7 H 


 


Chloride   81.4 L 


 


Carbon Dioxide   


 


BUN   21 H 


 


Creatinine   


 


Glucose   55 L 


 


POC Glucose   


 


Lactic Acid    2.90 H*


 


Calcium   8.3 L 


 


Phosphorus   


 


Magnesium   


 


Total Bilirubin   9.80 H 


 


Direct Bilirubin   


 


AST   129 H 


 


ALT   60 H 


 


Alkaline Phosphatase   303 H 


 


NT-Pro-B Natriuret Pep   


 


Total Protein   


 


Albumin   2.5 L 


 


Salicylates   


 


Acetaminophen   














  01/09/22 01/09/22 01/10/22





  22:21 22:21 05:20


 


WBC   


 


Hgb  15.6 H  


 


Hct  46.9 H  


 


MCV  109 H   108 H


 


MCH  36 H   36 H


 


RDW  16.1 H   16.0 H


 


Plt Count  66 L   67 L


 


Mono % (Auto)  13.6 H  


 


Seg Neuts % (Manual)    82.0 H


 


Lymphocytes % (Manual)    12.0 L


 


Monocytes % (Manual)   


 


Seg Neutrophils # Man   


 


Lymphocytes # (Manual)    1.0 L


 


Monocytes # (Manual)   


 


PT   


 


INR   


 


Sodium   120 L 


 


Potassium   5.6 H 


 


Chloride   89.1 L 


 


Carbon Dioxide   18 L 


 


BUN   28 H 


 


Creatinine   


 


Glucose   121 H 


 


POC Glucose   


 


Lactic Acid   


 


Calcium   7.5 L 


 


Phosphorus   


 


Magnesium   


 


Total Bilirubin   7.90 H 


 


Direct Bilirubin   


 


AST   115 H 


 


ALT   


 


Alkaline Phosphatase   304 H 


 


NT-Pro-B Natriuret Pep   


 


Total Protein   5.6 L 


 


Albumin   2.1 L 


 


Salicylates   


 


Acetaminophen   














  01/10/22 01/11/22 01/11/22





  05:20 11:49 15:07


 


WBC   


 


Hgb    15.6 H


 


Hct    47.3 H


 


MCV    108 H


 


MCH    36 H


 


RDW    16.3 H


 


Plt Count    40 L


 


Mono % (Auto)   


 


Seg Neuts % (Manual)    80.0 H


 


Lymphocytes % (Manual)    6.0 L


 


Monocytes % (Manual)    14.0 H


 


Seg Neutrophils # Man    7.8 H


 


Lymphocytes # (Manual)    0.6 L


 


Monocytes # (Manual)    1.4 H


 


PT   


 


INR   


 


Sodium  124 L  


 


Potassium   


 


Chloride  90.8 L  


 


Carbon Dioxide   


 


BUN  30 H  


 


Creatinine   


 


Glucose   


 


POC Glucose   107 H 


 


Lactic Acid   


 


Calcium  8.0 L  


 


Phosphorus   


 


Magnesium   


 


Total Bilirubin  6.70 H  


 


Direct Bilirubin   


 


AST  97 H  


 


ALT   


 


Alkaline Phosphatase  318 H  


 


NT-Pro-B Natriuret Pep   


 


Total Protein  5.1 L  


 


Albumin  2.2 L  


 


Salicylates   


 


Acetaminophen   














  01/11/22 01/13/22 01/13/22





  15:07 13:12 13:12


 


WBC   


 


Hgb    16.7 H


 


Hct    51.5 H


 


MCV    111 H


 


MCH    36 H


 


RDW    16.6 H


 


Plt Count    60 L


 


Mono % (Auto)   


 


Seg Neuts % (Manual)    89.0 H


 


Lymphocytes % (Manual)    2.0 L


 


Monocytes % (Manual)   


 


Seg Neutrophils # Man    9.3 H


 


Lymphocytes # (Manual)    0.2 L


 


Monocytes # (Manual)   


 


PT   


 


INR   


 


Sodium  123 L  


 


Potassium   


 


Chloride  93.7 L  


 


Carbon Dioxide  19 L  


 


BUN  22 H  


 


Creatinine  0.5 L D  


 


Glucose  136 H  


 


POC Glucose   


 


Lactic Acid   


 


Calcium  8.2 L  


 


Phosphorus   5.30 H 


 


Magnesium   


 


Total Bilirubin  12.20 H  


 


Direct Bilirubin   


 


AST  102 H  


 


ALT  57 H  


 


Alkaline Phosphatase  296 H  


 


NT-Pro-B Natriuret Pep   


 


Total Protein  5.3 L  


 


Albumin  2.3 L  


 


Salicylates   


 


Acetaminophen   














  01/13/22 01/13/22 01/14/22





  13:12 13:12 08:20


 


WBC    13.1 H


 


Hgb    17.2 H


 


Hct    53.6 H


 


MCV    111 H


 


MCH    36 H


 


RDW    17.1 H


 


Plt Count    26 L


 


[place] % (Auto)   


 


Seg Neuts % (Manual)    86.0 H


 


Lymphocytes % (Manual)    8.0 L


 


Monocytes % (Manual)   


 


Seg Neutrophils # Man    11.3 H


 


Lymphocytes # (Manual)    1.0 L


 


Monocytes # (Manual)   


 


PT   


 


INR   


 


Sodium  119 L*  123 L 


 


Potassium  7.6 H* D  6.8 H* 


 


Chloride  90.9 L  93.0 L 


 


Carbon Dioxide  16 L  13 L 


 


BUN  31 H  33 H 


 


Creatinine   


 


Glucose  62 L  60 L 


 


POC Glucose   


 


Lactic Acid   


 


Calcium   


 


Phosphorus   


 


Magnesium   


 


Total Bilirubin  11.90 H  


 


Direct Bilirubin   


 


AST  124 H  


 


ALT   


 


Alkaline Phosphatase  303 H  


 


NT-Pro-B Natriuret Pep   


 


Total Protein   


 


Albumin  2.3 L  


 


Salicylates   


 


Acetaminophen   














  01/14/22





  08:20


 


WBC 


 


Hgb 


 


Hct 


 


MCV 


 


MCH 


 


RDW 


 


Plt Count 


 


Mono % (Auto) 


 


Seg Neuts % (Manual) 


 


Lymphocytes % (Manual) 


 


Monocytes % (Manual) 


 


Seg Neutrophils # Man 


 


Lymphocytes # (Manual) 


 


Monocytes # (Manual) 


 


PT 


 


INR 


 


Sodium  123 L


 


Potassium  7.1 H*


 


Chloride  92.0 L


 


Carbon Dioxide  16 L


 


BUN  39 H


 


Creatinine 


 


Glucose  58 L


 


POC Glucose 


 


Lactic Acid 


 


Calcium 


 


Phosphorus 


 


Magnesium 


 


Total Bilirubin  13.40 H


 


Direct Bilirubin 


 


AST  174 H


 


ALT  75 H


 


Alkaline Phosphatase  312 H


 


NT-Pro-B Natriuret Pep 


 


Total Protein  6.0 L


 


Albumin  2.4 L


 


Salicylates 


 


Acetaminophen 











Chest x-ray: pending


Allied health notes reviewed: nursing

## 2022-01-14 NOTE — CONSULTATION
History of Present Illness





- Reason for Consult


Consult date: 01/14/22


acute renal failure, hyperkalemia, metabolic acidosis


Requesting physician: BEHZAD LEE





- History of Present Illness





46-year-old male with history of CAD and liver failure in the past of unclear et

iology and chronicity presents complaining of proximately 5 days of progressive 

dyspnea on exertion as well as lower extremity edema.  Patient is unable to 

identify the cause or the amount of time that he has had liver failure but says 

this has been an ongoing problem and he has developed ascites in the past, 

however, the last time he needed a paracentesis was approximately 4 years ago.  

He also says he has had subjective fever, mild dry cough, and intermittent chest

pain which she is unable to describe.  There are no known aggravating or 

alleviating factors.  He has not tried to take anything for his symptoms. he has

been taking his diuretics as prescribed. Denies any associated headache, vision 

change, neck pain, back pain, abdominal pain, nausea/vomiting, focal weakness, 

sensory changes, room spinning dizziness, dysuria, or any other complaints.  He 

is fully vaccinated against COVID-19.  He denies alcohol use.








- Past Medical History


Previous Medical History?: Yes


Hx Heart Attack/AMI: Yes


Hx Liver Disease: Yes





- Social History


Smoking Status: Never Smoker


Substance Use Type: None





ROS: 


Stated complaint: INSOMNIA X 3 NIGHTS


Other details as noted in HPI





Comment: All other systems reviewed and negative


Constitutional: fever, malaise.  denies: chills


Eyes: denies: eye pain, vision change


ENT: denies: throat pain, congestion


Respiratory: cough, shortness of breath


Cardiovascular: chest pain, edema.  denies: palpitations, syncope


Gastrointestinal: denies: abdominal pain, nausea, vomiting


Genitourinary: denies: dysuria, frequency


Musculoskeletal: denies: back pain, arthralgia


Skin: denies: rash, lesions


Neurological: denies: headache, weakness, numbness


.





Medications and Allergies


                                    Allergies











Allergy/AdvReac Type Severity Reaction Status Date / Time


 


No Known Allergies Allergy   Unverified 01/08/22 05:34











                                Home Medications











 Medication  Instructions  Recorded  Confirmed  Last Taken  Type


 


No Known Home Medications [No  01/12/22 01/12/22 Unknown History





Reported Home Medications]     











Active Meds: 


Active Medications





Acetaminophen (Acetaminophen 325 Mg Tab)  650 mg PO Q4H PRN


   PRN Reason: Pain MILD(1-3)/Fever >100.5/HA


Famotidine (Famotidine 20 Mg Tab)  20 mg PO BID [facility]


Sodium Bicarbonate 150 meq/ (Dextrose)  1,150 mls @ 100 mls/hr IV AS DIRECT GERMANIA


Metoclopramide HCl (Metoclopramide 10 Mg/2 Ml Inj)  10 mg IV Q6H PRN


   PRN Reason: Nausea And Vomiting


   Last Admin: 01/12/22 23:20 Dose:  10 mg


   


Ondansetron HCl (Ondansetron 4 Mg/2 Ml Inj)  4 mg IV Q8H PRN


   PRN Reason: Nausea And Vomiting


Oxycodone/Acetaminophen (Oxycodone /Acetaminophen 5-325mg Tab)  1 tab PO Q6H PRN


   PRN Reason: Pain, Moderate (4-6)


   Last Admin: 01/12/22 04:04 Dose:  1 tab


   


Sodium Chloride (Sodium Chloride 0.9% 10 Ml Flush Syringe)  10 ml IV PRN PRN


   PRN Reason: LINE FLUSH


Sodium Chloride (Sodium Chloride 1 Gm Tab)  1 gm PO TID [facility]











Exam





- Vital Signs


Vital signs: 


                                   Vital Signs











Temp Pulse Resp BP Pulse Ox


 


 98.7 F   83   18   116/71   100 


 


 01/08/22 05:38  01/08/22 05:38  01/08/22 05:38  01/08/22 05:38  01/08/22 05:38














- Physical Exam


Narrative exam: 





GENERAL: Well developed and well nourished. No acute distress


HEAD: Normocephalic. No obvious signs of trauma. 


ENT: Slightly dry mucous membranes.


EYES: Bilateral scleral icterus. extraocular movements are intact. Pupils are 

equal round and reactive to light bilaterally


NECK: Supple. Full ROM is intact. Trachea is midline.


LUNGS: Tachypneic but in no respiratory distress.  Equal chest rise bilaterally.

 Slightly coarse breath sounds throughout without discrete rales, rhonchi, or 

wheezes.


CARDIOVASCULAR: Regular rate and rhythm. No obvious murmurs or rubs.


VASCULAR: Cap refill < 2 seconds. 3+ pitting edema of the bilateral lower 

extremities


ABDOMEN: Abdomen is distended but soft. There is no significant tenderness, 

guarding or rebound.


SKIN: Skin is jaundiced, warm and dry


NEURO: Patient is awake, alert, and oriented. CNs II-XII grossly intact. No 

focal deficits. Normal motor and sensory exam throughout. Normal speech.  


MUSCULOSKELETAL: No obvious deformities. No significant tenderness. Normal ROM 

throughout. 


BACK/SPINE: No midline tenderness or step-offs of the C/T/L spine. No 

costovertebral angle tenderness





Results





- Lab Results





                                 01/14/22 08:20





                                 01/14/22 08:20


                             Most recent lab results











Calcium  9.2 mg/dL (8.4-10.2)   01/14/22  08:20    


 


Phosphorus  5.30 mg/dL (2.5-4.5)  H  01/13/22  13:12    


 


Magnesium  2.10 mg/dL (1.7-2.3)   01/13/22  13:12    














Assessment and Plan





Impression:


CORIN


End-stage liver disease (decompensated)


Severe hyponatremia


Hypotension


Possible liver cirrhosis


Acute exacerbation of congestive heart failure


Obesity


CAD


Thrombocytopenia


Elevated serum transaminases


Hyperbilirubinemia


Hyperkalemia








Plan:


treat k medically


add bicarb gtt and kayexalate


place NG tube for access


will need RRT if k is refractory to medical treatment


liver management per GI


avoid nephrotoxins


daily lytes and strict i/os


renal diet

## 2022-01-15 PROCEDURE — 4A033R1 MEASUREMENT OF ARTERIAL SATURATION, PERIPHERAL, PERCUTANEOUS APPROACH: ICD-10-PCS | Performed by: INTERNAL MEDICINE

## 2022-01-15 RX ADMIN — Medication PRN ML: at 22:50

## 2022-01-15 RX ADMIN — SODIUM CHLORIDE TAB 1 GM SCH: 1 TAB at 22:50

## 2022-01-15 RX ADMIN — FAMOTIDINE SCH: 20 TABLET ORAL at 22:50

## 2022-01-15 RX ADMIN — FAMOTIDINE SCH MG: 20 TABLET ORAL at 09:42

## 2022-01-15 RX ADMIN — SODIUM CHLORIDE TAB 1 GM SCH: 1 TAB at 14:23

## 2022-01-15 RX ADMIN — SODIUM CHLORIDE TAB 1 GM SCH GM: 1 TAB at 09:44

## 2022-01-15 RX ADMIN — SODIUM BICARBONATE SCH MLS/HR: 84 INJECTION, SOLUTION INTRAVENOUS at 14:23

## 2022-01-15 NOTE — XRAY REPORT
XR chest 1V ap



INDICATION / CLINICAL INFORMATION:

Aspiration. 



COMPARISON: 

1/8/2022



FINDINGS:



SUPPORT DEVICES: None.



HEART /PULMONARY VASCULATURE: Prominent but stable. 



LUNGS / PLEURA: Worsening airspace disease within the right mid-upper lung. Patchy airspace opacities
 at the left upper lung appear similar. No sizable pleural effusion. No pneumothorax.



IMPRESSION:

Worsening airspace disease within the lungs, most pronounced within the right mid-upper lung.



Signer Name: Navjot Gustafson MD 

Signed: 1/15/2022 4:16 PM

Workstation Name: [email or web address]

## 2022-01-15 NOTE — PROGRESS NOTE
Assessment and Plan





- Patient Problems


(1) Hyponatremia


Current Visit: Yes   Status: Acute   


Plan to address problem: 


Check urine osmolarity and serum osmolarity


Sodium improved from 112 to 116


3 percent NS --500 ml over 10 hours


Change from 124-123


Will try desmopressin








(2) Liver failure


Current Visit: Yes   Status: Chronic   


Qualifiers: 


   Liver failure chronicity: chronic   Hepatic coma status: without hepatic coma

  Qualified Code(s): K72.10 - Chronic hepatic failure without coma   


Plan to address problem: 


Patient has end-stage lung disease


Elevated total bili of 8.9 and direct bili of 3.5, AST is 118 and ALT is 58








(3) Hypotension


Current Visit: Yes   Status: Acute   


Qualifiers: 


   Hypotension type: unspecified hypotension type   Qualified Code(s): I95.9 - 

Hypotension, unspecified   


Plan to address problem: 


Patient is on Levophed at 20 mics 








(4) Cirrhosis of liver


Current Visit: Yes   Status: Chronic   


Qualifiers: 


   Ascites presence: without ascites 


Plan to address problem: 


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


Patient is a poor historian


Says he has no family











(5) Hypomagnesemia


Current Visit: Yes   Status: Acute   


Plan to address problem: 


Supplemented with IV magnesium


Corrected








(6) Polycythemia due to fall in plasma volume


Current Visit: Yes   Status: Acute   


Plan to address problem: 


Gentle hydration given the elevated BNP


Improved








(7) Malnutrition


Current Visit: Yes   Status: Chronic   


Qualifiers: 


   Protein-calorie malnutrition severity: moderate 


Plan to address problem: 


Dietary supplements requested








(8) Elevated brain natriuretic peptide (BNP) level


Current Visit: Yes   Status: Deleted   


Plan to address problem: 


Echocardiogram for ejection fraction and valve function








(9) DVT prophylaxis


Current Visit: Yes   Status: Acute   


Plan to address problem: 


SCDs and GI prophylaxis








Subjective


Date of service: 01/15/22


Principal diagnosis: ESLD; Hyponatremia; AE-CHF; Obesity; CAD; Thrombocytopenia;

Hyperkalemia


Interval history: 





46-year-old male with history of CAD and liver failure in the past of unclear 

etiology and chronicity presents complaining of proximately 5 days of 

progressive dyspnea on exertion as well as lower extremity edema.  Patient is 

unable to identify the cause or the amount of time that he has had liver failure

but says this has been an ongoing problem and he has developed ascites in the 

past, however, the last time he needed a paracentesis was approximately 4 years 

ago.  He also says he has had subjective fever, mild dry cough, and intermittent

chest pain which she is unable to describe.  There are no known aggravating or 

alleviating factors.  He has not tried to take anything for his symptoms. he has

been taking his diuretics as prescribed. Denies any associated headache, vision 

change, neck pain, back pain, abdominal pain, nausea/vomiting, focal weakness, 

sensory changes, room spinning dizziness, dysuria, or any other complaints.  He 

is fully vaccinated against COVID-19.  He denies alcohol use.





1/9/22


Na112 to 116Sx better





1/10/2022


Sodium improved from 112 --116 yesterday and today it is 124


Patient is more alert


Patient has end-stage liver disease





1/11/2022


Sodium is 123


Patient on desmopressin and 3% saline x500 mL











Objective





- Constitutional


Vitals: 


                               Vital Signs - 12hr











  01/15/22 01/15/22 01/15/22





  15:50 17:40 18:00


 


Temperature 98.1 F  


 


Pulse Rate 90  


 


Pulse Rate [   84





From Monitor]   


 


Respiratory 16  20





Rate   


 


Blood Pressure 104/39  


 


O2 Sat by Pulse 92 96 95





Oximetry   














  01/15/22





  19:23


 


Temperature 97.0 F L


 


Pulse Rate 57 L


 


Pulse Rate [ 





From Monitor] 


 


Respiratory 20





Rate 


 


Blood Pressure 108/71


 


O2 Sat by Pulse 98





Oximetry 











General appearance: Present: no acute distress, well-nourished





- EENT


Eyes: PERRL, EOM intact


ENT: hearing intact, clear oral mucosa


Ears: bilateral: normal





- Neck


Neck: supple, normal ROM





- Respiratory


Respiratory effort: normal


Respiratory: bilateral: CTA





- Breasts


Breasts: normal





- Cardiovascular


Rhythm: regular


Heart Sounds: Present: S1 & S2.  Absent: gallop, rub


Extremities: pulses intact, No edema, normal color, Full ROM





- Gastrointestinal


General gastrointestinal: Present: soft, non-tender, non-distended, normal bowel

sounds





- Genitourinary


Male genitourinary: normal





- Integumentary


Integumentary: clear, warm, dry





- Musculoskeletal


Musculoskeletal: 1, strength equal bilaterally





- Neurologic


Neurologic: moves all extremities





- Psychiatric


Psychiatric: memory intact, appropriate mood/affect, intact judgment & insight





- Labs


CBC & Chem 7: 


                                 01/14/22 08:20





                                 01/14/22 21:03


Labs: 


                              Abnormal lab results











  01/15/22 01/15/22 Range/Units





  11:47 17:35 


 


ABG pH   7.520 H  (7.320-7.450)  


 


POC ABG pO2   57.7 L  ()  mmHg


 


ABG Oxyhemoglobin   88.2 L  (94-98)  


 


ABG Chloride   94.0 L  ()  mmol/L


 


ABG Glucose   113 H  (65-95)  mg/dL


 


Carboxyhemoglobin   2.5 H  (0.5-1.5)  


 


POC Glucose  126 H   ()  mg/dL


 


Arterial Blood Glucose   113 H  (65-95)  mg/dL














HEART Score





- HEART Score


Troponin: 


                                        











Troponin T  < 0.010 ng/mL (0.00-0.029)   01/08/22  12:34

## 2022-01-15 NOTE — PROGRESS NOTE
Assessment and Plan





Impression:


CORIN


End-stage liver disease (decompensated)


Severe hyponatremia


Hypotension


Possible liver cirrhosis


Acute exacerbation of congestive heart failure


Obesity


CAD


Thrombocytopenia


Elevated serum transaminases


Hyperbilirubinemia


Hyperkalemia








Plan:


treat k medically


added bicarb gtt and kayexalate


place NG tube for access


will need RRT if k is refractory to medical treatment


liver management per GI


avoid nephrotoxins


daily lytes and strict i/os


renal diet








Subjective


Date of service: 01/15/22


Principal diagnosis: ESLD; Hyponatremia; AE-CHF; Obesity; CAD; Thrombocytopenia;

Hyperkalemia


Interval history: 





resting in bed


events noted


labs and chart reviewed





Objective





- Exam


Narrative Exam: 





GENERAL: Well developed and well nourished. No acute distress


HEAD: Normocephalic. No obvious signs of trauma. 


ENT: Slightly dry mucous membranes.


EYES: Bilateral scleral icterus. extraocular movements are intact. Pupils are 

equal round and reactive to light bilaterally


NECK: Supple. Full ROM is intact. Trachea is midline.


LUNGS: Tachypneic but in no respiratory distress.  Equal chest rise bilaterally.

 Slightly coarse breath sounds throughout without discrete rales, rhonchi, or 

wheezes.


CARDIOVASCULAR: Regular rate and rhythm. No obvious murmurs or rubs.


VASCULAR: Cap refill < 2 seconds. 3+ pitting edema of the bilateral lower 

extremities


ABDOMEN: Abdomen is distended but soft. There is no significant tenderness, 

guarding or rebound.


SKIN: Skin is jaundiced, warm and dry


NEURO: Patient is awake, alert, and oriented. CNs II-XII grossly intact. No 

focal deficits. Normal motor and sensory exam throughout. Normal speech.  


MUSCULOSKELETAL: No obvious deformities. No significant tenderness. Normal ROM 

throughout. 


BACK/SPINE: No midline tenderness or step-offs of the C/T/L spine. No 

costovertebral angle tenderness





- Vital Signs


Vital signs: 


                               Vital Signs - 12hr











  01/14/22 01/14/22 01/15/22





  22:00 23:26 03:18


 


Temperature  97.4 F L 97.5 F L


 


Pulse Rate 81 84 85


 


Respiratory  18 18





Rate   


 


Blood Pressure  145/94 147/83


 


O2 Sat by Pulse 100 100 95





Oximetry   














- Lab





                                 01/14/22 08:20





                                 01/14/22 21:03


                             Most recent lab results











Calcium  9.3 mg/dL (8.4-10.2)   01/14/22  21:03    


 


Phosphorus  5.30 mg/dL (2.5-4.5)  H  01/13/22  13:12    


 


Magnesium  2.10 mg/dL (1.7-2.3)   01/13/22  13:12    














Medications & Allergies





- Medications


Allergies/Adverse Reactions: 


                                    Allergies





No Known Allergies Allergy (Unverified 01/08/22 05:34)


   








Home Medications: 


                                Home Medications











 Medication  Instructions  Recorded  Confirmed  Last Taken  Type


 


No Known Home Medications [No  01/12/22 01/12/22 Unknown History





Reported Home Medications]     











Active Medications: 














Generic Name Dose Route Start Last Admin





  Trade Name Imer  PRN Reason Stop Dose Admin


 


Acetaminophen  650 mg  01/08/22 23:52 





  Acetaminophen 325 Mg Tab  PO  





  Q4H PRN  





  Pain MILD(1-3)/Fever >100.5/HA  


 


Famotidine  20 mg  01/14/22 22:00  01/14/22 22:39





  Famotidine 20 Mg Tab  PO   20 mg





  BID GERMANIA   Administration


 


Sodium Bicarbonate 150 meq/  1,150 mls @ 100 mls/hr  01/14/22 15:00  01/14/22 

23:48





  Dextrose  IV   100 mls/hr





  AS DIRECT GERMANIA   Administration


 


Metoclopramide HCl  10 mg  01/08/22 23:52  01/12/22 23:20





  Metoclopramide 10 Mg/2 Ml Inj  IV   10 mg





  Q6H PRN   Administration





  Nausea And Vomiting  


 


Ondansetron HCl  4 mg  01/08/22 23:52 





  Ondansetron 4 Mg/2 Ml Inj  IV  





  Q8H PRN  





  Nausea And Vomiting  


 


Oxycodone/Acetaminophen  1 tab  01/08/22 23:52  01/12/22 04:04





  Oxycodone /Acetaminophen 5-325mg Tab  PO   1 tab





  Q6H PRN   Administration





  Pain, Moderate (4-6)  


 


Sodium Chloride  10 ml  01/08/22 23:52  01/14/22 22:41





  Sodium Chloride 0.9% 10 Ml Flush Syringe  IV   10 ml





  PRN PRN   Administration





  LINE FLUSH  


 


Sodium Chloride  1 gm  01/14/22 20:00  01/14/22 22:40





  Sodium Chloride 1 Gm Tab  PO   1 gm





  TID GERMANIA   Administration

## 2022-01-15 NOTE — PROGRESS NOTE
Assessment and Plan





Impression:


CORIN


End-stage liver disease (decompensated)


Severe hyponatremia


Hypotension


Possible liver cirrhosis


Acute exacerbation of congestive heart failure


Obesity


CAD


Thrombocytopenia


Elevated serum transaminases


Hyperbilirubinemia


Hyperkalemia








Plan:


treat k medically, k was improving last PM, labs not done as of yet today


added bicarb gtt and kayexalate


placed NG tube for po medication access


liver management per GI


avoid nephrotoxins


daily lytes and strict i/os


renal diet








Subjective


Date of service: 01/15/22


Principal diagnosis: ESLD; Hyponatremia; AE-CHF; Obesity; CAD; Thrombocytopenia;

Hyperkalemia


Interval history: 





resting in bed


events noted


labs and chart reviewed





Objective





- Exam


Narrative Exam: 





GENERAL: Well developed and well nourished. No acute distress


HEAD: Normocephalic. No obvious signs of trauma. 


ENT: Slightly dry mucous membranes.


EYES: Bilateral scleral icterus. extraocular movements are intact. Pupils are 

equal round and reactive to light bilaterally


NECK: Supple. Full ROM is intact. Trachea is midline.


LUNGS: Tachypneic but in no respiratory distress.  Equal chest rise bilaterally.

 Slightly coarse breath sounds throughout without discrete rales, rhonchi, or 

wheezes.


CARDIOVASCULAR: Regular rate and rhythm. No obvious murmurs or rubs.


VASCULAR: Cap refill < 2 seconds. 3+ pitting edema of the bilateral lower 

extremities


ABDOMEN: Abdomen is distended but soft. There is no significant tenderness, 

guarding or rebound.


SKIN: Skin is jaundiced, warm and dry


NEURO: Patient is awake, alert, and oriented. CNs II-XII grossly intact. No 

focal deficits. Normal motor and sensory exam throughout. Normal speech.  


MUSCULOSKELETAL: No obvious deformities. No significant tenderness. Normal ROM 

throughout. 


BACK/SPINE: No midline tenderness or step-offs of the C/T/L spine. No 

costovertebral angle tenderness





- Vital Signs


Vital signs: 


                               Vital Signs - 12hr











  01/14/22 01/14/22 01/15/22





  22:00 23:26 03:18


 


Temperature  97.4 F L 97.5 F L


 


Pulse Rate 81 84 85


 


Respiratory  18 18





Rate   


 


Blood Pressure  145/94 147/83


 


O2 Sat by Pulse 100 100 95





Oximetry   














- Lab





                                 01/14/22 08:20





                                 01/14/22 21:03


                             Most recent lab results











Calcium  9.3 mg/dL (8.4-10.2)   01/14/22  21:03    


 


Phosphorus  5.30 mg/dL (2.5-4.5)  H  01/13/22  13:12    


 


Magnesium  2.10 mg/dL (1.7-2.3)   01/13/22  13:12    














Medications & Allergies





- Medications


Allergies/Adverse Reactions: 


                                    Allergies





No Known Allergies Allergy (Unverified 01/08/22 05:34)


   








Home Medications: 


                                Home Medications











 Medication  Instructions  Recorded  Confirmed  Last Taken  Type


 


No Known Home Medications [No  01/12/22 01/12/22 Unknown History





Reported Home Medications]     











Active Medications: 














Generic Name Dose Route Start Last Admin





  Trade Name Freq  PRN Reason Stop Dose Admin


 


Acetaminophen  650 mg  01/08/22 23:52 





  Acetaminophen 325 Mg Tab  PO  





  Q4H PRN  





  Pain MILD(1-3)/Fever >100.5/HA  


 


Famotidine  20 mg  01/14/22 22:00  01/14/22 22:39





  Famotidine 20 Mg Tab  PO   20 mg





  BID GERMANIA   Administration


 


Sodium Bicarbonate 150 meq/  1,150 mls @ 100 mls/hr  01/14/22 15:00  01/14/22 

23:48





  Dextrose  IV   100 mls/hr





  AS DIRECT GERMANIA   Administration


 


Metoclopramide HCl  10 mg  01/08/22 23:52  01/12/22 23:20





  Metoclopramide 10 Mg/2 Ml Inj  IV   10 mg





  Q6H PRN   Administration





  Nausea And Vomiting  


 


Ondansetron HCl  4 mg  01/08/22 23:52 





  Ondansetron 4 Mg/2 Ml Inj  IV  





  Q8H PRN  





  Nausea And Vomiting  


 


Oxycodone/Acetaminophen  1 tab  01/08/22 23:52  01/12/22 04:04





  Oxycodone /Acetaminophen 5-325mg Tab  PO   1 tab





  Q6H PRN   Administration





  Pain, Moderate (4-6)  


 


Sodium Chloride  10 ml  01/08/22 23:52  01/14/22 22:41





  Sodium Chloride 0.9% 10 Ml Flush Syringe  IV   10 ml





  PRN PRN   Administration





  LINE FLUSH  


 


Sodium Chloride  1 gm  01/14/22 20:00  01/14/22 22:40





  Sodium Chloride 1 Gm Tab  PO   1 gm





  TID GERMANIA   Administration

## 2022-01-15 NOTE — PROGRESS NOTE
Assessment and Plan








End-stage liver disease (decompensated)


Severe hyponatremia


Hypotension


Possible liver cirrhosis


Acute exacerbation of congestive heart failure


Obesity


CAD


Thrombocytopenia


Elevated serum transaminases


Hyperbilirubinemia


Hyperkalemia





- get 2D ECHO and address


- nephrology input appreciated


- follow repeat BMP


- get ABG to assess ventilation


- follow CXR re: ? aspiration


- continue care as below otherwise;





- continue accuchecks with glycemic control per SSI (While critically ill target

blood glucose of < 180 mg/dL; avoid hypoglycemia)


- continue to wean supplemental oxygen for target O2 sat's > 90% acutely


- aspiration precautions


- prn bronchodilators (YOEL) with pulm hygiene per RT


- wean per pulmonary driven protocols otherwise


- avoid nephrotoxins, renally dose all medications


- AB's per ID rec's 


- prn analgesia per CPOT score


- Maintenance of sleep-wake cycle, avoid delirium


- continue enteral nutritional support at goal rate as tolerated


- G.I. & VTE prophylaxis


- PT/OT/ROM exercises


- continue mobility protocols for pressure ulcer prophylaxis


- Monitor hemodynamics closely


- continue other care per attending / other consultants


- discharge planning ongoing concurrently





COVID SPECIFIC INTERVENTIONS


- COVID-19 test negative 





.... Re-evaluate in am & prn





CONDITION: CRITICAL


PROGNOSIS: GUARDED


CODE STATUS: FULL CODE





The high probability of a clinically significant, sudden or life-threatening 

deterioration of the [respiratory, cardiovascular & renal] system(s) required my

full and direct attention, intervention and personal management. The aggregate 

critical care time was [32] minutes without overlap. Time includes spent on;





[x] Data Review and interpretation 





[x] Patient assessment and monitoring of vital signs 





[x] Documentation 





[x] Medication orders and management











Subjective


Date of service: 01/15/22


Principal diagnosis: ESLD; Hyponatremia; AE-CHF; Obesity; CAD; Thrombocytopenia;

 Hyperkalemia


Interval history: 





Patient is seen today for: ESLD;  Hyponatremia; Hypotension; AE-CHF; Obesity; 

CAD; Thrombocytopenia; Hyperbilirubinemia; Hyperkalemia





Seen and examined at bedside; 24hour events reviewed; nursing and respiratory 

care staff consulted; no adverse overnight events reported to me; resting 

peacefully in bed; CXR pending; K+ down to 5.7 yesterday; BMP pending; he 

remains somnolent to lethargic but no emesis reported.





Objective


                               Vital Signs - 12hr











  01/15/22 01/15/22 01/15/22





  03:18 07:55 08:17


 


Temperature 97.5 F L  97.4 F L


 


Pulse Rate 85 85 85


 


Respiratory 18  18





Rate   


 


Blood Pressure 147/83  133/70


 


O2 Sat by Pulse 95  98





Oximetry   











Constitutional: no acute distress, other (mildl hypoventilation / respiratory 

effort at rest)


Eyes: icteric


ENT: oropharynx moist


Neck: supple, no lymphadenopathy


Effort: mildly labored


Ascultation: Bilateral: diminished breath sounds


Percussion: Bilateral: not dull


Cardiovascular: regular rate and rhythm


Gastrointestinal: normoactive bowel sounds, soft, non-tender, non-distended 

(protuberant)


Integumentary: erythema


Extremities: no cyanosis, pink and warm, pulses normal, edema (2+)


Neurologic: non-focal exam (grossly), pupils equal and round, CN II-XII normal, 

other (lethargic)


Psychiatric: depressed


CBC and BMP: 


                                 01/14/22 08:20





                                 01/14/22 21:03


ABG, PT/INR, D-dimer: 


PT/INR, D-dimer











PT  21.0 Sec. (12.2-14.9)  H  01/08/22  10:08    


 


INR  1.64  (0.87-1.13)  H  01/08/22  10:08    








Abnormal lab findings: 


                                  Abnormal Labs











  01/08/22 01/08/22 01/08/22





  10:08 10:08 10:08


 


WBC   


 


Hgb  17.6 H  


 


Hct  53.0 H  


 


MCV  107 H  


 


MCH  35 H  


 


RDW  15.7 H  


 


Plt Count  82 L  


 


[place] % (Auto)   


 


Seg Neuts % (Manual)  92.0 H  


 


Lymphocytes % (Manual)  4.0 L  


 


Monocytes % (Manual)   


 


Seg Neutrophils # Man   


 


Lymphocytes # (Manual)  0.3 L  


 


Monocytes # (Manual)   


 


PT   21.0 H 


 


INR   1.64 H 


 


Sodium   


 


Potassium   


 


Chloride   


 


Carbon Dioxide   


 


BUN   


 


Creatinine   


 


Glucose   


 


POC Glucose   


 


Lactic Acid   


 


Calcium   


 


Phosphorus   


 


Magnesium   


 


Total Bilirubin   


 


Direct Bilirubin   


 


AST   


 


ALT   


 


Alkaline Phosphatase   


 


Ammonia   


 


NT-Pro-B Natriuret Pep    2571 H


 


Total Protein   


 


Albumin   


 


Salicylates   


 


Acetaminophen   














  01/08/22 01/08/22 01/08/22





  10:10 12:34 12:34


 


WBC   


 


Hgb   


 


Hct   


 


MCV   


 


MCH   


 


RDW   


 


Plt Count   


 


Mono % (Auto)   


 


Seg Neuts % (Manual)   


 


Lymphocytes % (Manual)   


 


Monocytes % (Manual)   


 


Seg Neutrophils # Man   


 


Lymphocytes # (Manual)   


 


Monocytes # (Manual)   


 


PT   


 


INR   


 


Sodium  112 L*  


 


Potassium   


 


Chloride  77.6 L  


 


Carbon Dioxide   


 


BUN   


 


Creatinine   


 


Glucose   


 


POC Glucose   


 


Lactic Acid   


 


Calcium  8.3 L  


 


Phosphorus   


 


Magnesium  1.60 L  


 


Total Bilirubin  8.90 H  


 


Direct Bilirubin  3.5 H  


 


AST  118 H  


 


ALT  58 H  


 


Alkaline Phosphatase  359 H  


 


Ammonia   


 


NT-Pro-B Natriuret Pep   


 


Total Protein   


 


Albumin  2.9 L  


 


Salicylates   < 0.3 L 


 


Acetaminophen    5.0 L














  01/09/22 01/09/22 01/09/22





  04:58 04:58 19:50


 


WBC  11.9 H  


 


Hgb  16.8 H  


 


Hct  50.2 H  


 


MCV  107 H  


 


MCH  36 H  


 


RDW  15.9 H  


 


Plt Count  94 L  


 


[place] % (Auto)   


 


Seg Neuts % (Manual)  94.0 H  


 


Lymphocytes % (Manual)  5.0 L  


 


Monocytes % (Manual)   


 


Seg Neutrophils # Man  11.2 H  


 


Lymphocytes # (Manual)  0.6 L  


 


Monocytes # (Manual)   


 


PT   


 


INR   


 


Sodium   116 L* 


 


Potassium   5.7 H 


 


Chloride   81.4 L 


 


Carbon Dioxide   


 


BUN   21 H 


 


Creatinine   


 


Glucose   55 L 


 


POC Glucose   


 


Lactic Acid    2.90 H*


 


Calcium   8.3 L 


 


Phosphorus   


 


Magnesium   


 


Total Bilirubin   9.80 H 


 


Direct Bilirubin   


 


AST   129 H 


 


ALT   60 H 


 


Alkaline Phosphatase   303 H 


 


Ammonia   


 


NT-Pro-B Natriuret Pep   


 


Total Protein   


 


Albumin   2.5 L 


 


Salicylates   


 


Acetaminophen   














  01/09/22 01/09/22 01/10/22





  22:21 22:21 05:20


 


WBC   


 


Hgb  15.6 H  


 


Hct  46.9 H  


 


MCV  109 H   108 H


 


MCH  36 H   36 H


 


RDW  16.1 H   16.0 H


 


Plt Count  66 L   67 L


 


Mono % (Auto)  13.6 H  


 


Seg Neuts % (Manual)    82.0 H


 


Lymphocytes % (Manual)    12.0 L


 


Monocytes % (Manual)   


 


Seg Neutrophils # Man   


 


Lymphocytes # (Manual)    1.0 L


 


Monocytes # (Manual)   


 


PT   


 


INR   


 


Sodium   120 L 


 


Potassium   5.6 H 


 


Chloride   89.1 L 


 


Carbon Dioxide   18 L 


 


BUN   28 H 


 


Creatinine   


 


Glucose   121 H 


 


POC Glucose   


 


Lactic Acid   


 


Calcium   7.5 L 


 


Phosphorus   


 


Magnesium   


 


Total Bilirubin   7.90 H 


 


Direct Bilirubin   


 


AST   115 H 


 


ALT   


 


Alkaline Phosphatase   304 H 


 


Ammonia   


 


NT-Pro-B Natriuret Pep   


 


Total Protein   5.6 L 


 


Albumin   2.1 L 


 


Salicylates   


 


Acetaminophen   














  01/10/22 01/11/22 01/11/22





  05:20 11:49 15:07


 


WBC   


 


Hgb    15.6 H


 


Hct    47.3 H


 


MCV    108 H


 


MCH    36 H


 


RDW    16.3 H


 


Plt Count    40 L


 


Mono % (Auto)   


 


Seg Neuts % (Manual)    80.0 H


 


Lymphocytes % (Manual)    6.0 L


 


Monocytes % (Manual)    14.0 H


 


Seg Neutrophils # Man    7.8 H


 


Lymphocytes # (Manual)    0.6 L


 


Monocytes # (Manual)    1.4 H


 


PT   


 


INR   


 


Sodium  124 L  


 


Potassium   


 


Chloride  90.8 L  


 


Carbon Dioxide   


 


BUN  30 H  


 


Creatinine   


 


Glucose   


 


POC Glucose   107 H 


 


Lactic Acid   


 


Calcium  8.0 L  


 


Phosphorus   


 


Magnesium   


 


Total Bilirubin  6.70 H  


 


Direct Bilirubin   


 


AST  97 H  


 


ALT   


 


Alkaline Phosphatase  318 H  


 


Ammonia   


 


NT-Pro-B Natriuret Pep   


 


Total Protein  5.1 L  


 


Albumin  2.2 L  


 


Salicylates   


 


Acetaminophen   














  01/11/22 01/13/22 01/13/22





  15:07 13:12 13:12


 


WBC   


 


Hgb    16.7 H


 


Hct    51.5 H


 


MCV    111 H


 


MCH    36 H


 


RDW    16.6 H


 


Plt Count    60 L


 


Mono % (Auto)   


 


Seg Neuts % (Manual)    89.0 H


 


Lymphocytes % (Manual)    2.0 L


 


Monocytes % (Manual)   


 


Seg Neutrophils # Man    9.3 H


 


Lymphocytes # (Manual)    0.2 L


 


Monocytes # (Manual)   


 


PT   


 


INR   


 


Sodium  123 L  


 


Potassium   


 


Chloride  93.7 L  


 


Carbon Dioxide  19 L  


 


BUN  22 H  


 


Creatinine  0.5 L D  


 


Glucose  136 H  


 


POC Glucose   


 


Lactic Acid   


 


Calcium  8.2 L  


 


Phosphorus   5.30 H 


 


Magnesium   


 


Total Bilirubin  12.20 H  


 


Direct Bilirubin   


 


AST  102 H  


 


ALT  57 H  


 


Alkaline Phosphatase  296 H  


 


Ammonia   


 


NT-Pro-B Natriuret Pep   


 


Total Protein  5.3 L  


 


Albumin  2.3 L  


 


Salicylates   


 


Acetaminophen   














  01/13/22 01/13/22 01/14/22





  13:12 13:12 08:20


 


WBC    13.1 H


 


Hgb    17.2 H


 


Hct    53.6 H


 


MCV    111 H


 


MCH    36 H


 


RDW    17.1 H


 


Plt Count    26 L


 


[place] % (Auto)   


 


Seg Neuts % (Manual)    86.0 H


 


Lymphocytes % (Manual)    8.0 L


 


Monocytes % (Manual)   


 


Seg Neutrophils # Man    11.3 H


 


Lymphocytes # (Manual)    1.0 L


 


Monocytes # (Manual)   


 


PT   


 


INR   


 


Sodium  119 L*  123 L 


 


Potassium  7.6 H* D  6.8 H* 


 


Chloride  90.9 L  93.0 L 


 


Carbon Dioxide  16 L  13 L 


 


BUN  31 H  33 H 


 


Creatinine   


 


Glucose  62 L  60 L 


 


POC Glucose   


 


Lactic Acid   


 


Calcium   


 


Phosphorus   


 


Magnesium   


 


Total Bilirubin  11.90 H  


 


Direct Bilirubin   


 


AST  124 H  


 


ALT   


 


Alkaline Phosphatase  303 H  


 


Ammonia   


 


NT-Pro-B Natriuret Pep   


 


Total Protein   


 


Albumin  2.3 L  


 


Salicylates   


 


Acetaminophen   














  01/14/22 01/14/22 01/14/22





  08:20 21:03 21:03


 


WBC   


 


Hgb   


 


Hct   


 


MCV   


 


MCH   


 


RDW   


 


Plt Count   


 


Mono % (Auto)   


 


Seg Neuts % (Manual)   


 


Lymphocytes % (Manual)   


 


Monocytes % (Manual)   


 


Seg Neutrophils # Man   


 


Lymphocytes # (Manual)   


 


Monocytes # (Manual)   


 


PT   


 


INR   


 


Sodium  123 L   127 L


 


Potassium  7.1 H*   5.7 H


 


Chloride  92.0 L   93.3 L


 


Carbon Dioxide  16 L   20 L


 


BUN  39 H   45 H


 


Creatinine    1.4 H


 


Glucose  58 L   47 L


 


POC Glucose   


 


Lactic Acid   


 


Calcium   


 


Phosphorus   


 


Magnesium   


 


Total Bilirubin  13.40 H  


 


Direct Bilirubin   


 


AST  174 H  


 


ALT  75 H  


 


Alkaline Phosphatase  312 H  


 


Ammonia   249.0 H 


 


NT-Pro-B Natriuret Pep   


 


Total Protein  6.0 L  


 


Albumin  2.4 L  


 


Salicylates   


 


Acetaminophen   














  01/15/22





  11:47


 


WBC 


 


Hgb 


 


Hct 


 


MCV 


 


MCH 


 


RDW 


 


Plt Count 


 


Mono % (Auto) 


 


Seg Neuts % (Manual) 


 


Lymphocytes % (Manual) 


 


Monocytes % (Manual) 


 


Seg Neutrophils # Man 


 


Lymphocytes # (Manual) 


 


Monocytes # (Manual) 


 


PT 


 


INR 


 


Sodium 


 


Potassium 


 


Chloride 


 


Carbon Dioxide 


 


BUN 


 


Creatinine 


 


Glucose 


 


POC Glucose  126 H


 


Lactic Acid 


 


Calcium 


 


Phosphorus 


 


Magnesium 


 


Total Bilirubin 


 


Direct Bilirubin 


 


AST 


 


ALT 


 


Alkaline Phosphatase 


 


Ammonia 


 


NT-Pro-B Natriuret Pep 


 


Total Protein 


 


Albumin 


 


Salicylates 


 


Acetaminophen 











Chest x-ray: pending


Allied health notes reviewed: nursing

## 2022-01-16 VITALS — DIASTOLIC BLOOD PRESSURE: 44 MMHG | SYSTOLIC BLOOD PRESSURE: 80 MMHG

## 2022-01-16 LAB
BUN SERPL-MCNC: 39 MG/DL (ref 9–20)
BUN/CREAT SERPL: 43 %
CALCIUM SERPL-MCNC: 8.4 MG/DL (ref 8.4–10.2)
HEMOLYSIS INDEX: 28
INR PPP: 2.54 (ref 0.87–1.13)

## 2022-01-16 RX ADMIN — FAMOTIDINE SCH MG: 20 TABLET ORAL at 11:47

## 2022-01-16 RX ADMIN — SODIUM CHLORIDE TAB 1 GM SCH: 1 TAB at 16:00

## 2022-01-16 RX ADMIN — SODIUM CHLORIDE TAB 1 GM SCH GM: 1 TAB at 11:50

## 2022-01-16 NOTE — PROGRESS NOTE
Assessment and Plan





Impression:


CORIN


End-stage liver disease (decompensated)


Severe hyponatremia


Hypotension


Possible liver cirrhosis


Acute exacerbation of congestive heart failure


Obesity


CAD


Thrombocytopenia


Elevated serum transaminases


Hyperbilirubinemia


Hyperkalemia








Plan:


treat k medically, k is improving 


stop bicarb gtt and kayexalate


give NS infusion


placed NG tube for po medication access


liver management per GI


avoid nephrotoxins


daily lytes and strict i/os


renal diet








Subjective


Date of service: 01/16/22


Principal diagnosis: ESLD; Hyponatremia; AE-CHF; Obesity; CAD; Thrombocytopenia;

Hyperkalemia


Interval history: 





resting in bed


events noted


labs and chart reviewed





Objective





- Exam


Narrative Exam: 





GENERAL: Well developed and well nourished. No acute distress


HEAD: Normocephalic. No obvious signs of trauma. 


ENT: Slightly dry mucous membranes.


EYES: Bilateral scleral icterus. extraocular movements are intact. Pupils are 

equal round and reactive to light bilaterally


NECK: Supple. Full ROM is intact. Trachea is midline.


LUNGS: Tachypneic but in no respiratory distress.  Equal chest rise bilaterally.

 Slightly coarse breath sounds throughout without discrete rales, rhonchi, or 

wheezes.


CARDIOVASCULAR: Regular rate and rhythm. No obvious murmurs or rubs.


VASCULAR: Cap refill < 2 seconds. 3+ pitting edema of the bilateral lower 

extremities


ABDOMEN: Abdomen is distended but soft. There is no significant tenderness, 

guarding or rebound.


SKIN: Skin is jaundiced, warm and dry


NEURO: Patient is awake, alert, and oriented. CNs II-XII grossly intact. No 

focal deficits. Normal motor and sensory exam throughout. Normal speech.  


MUSCULOSKELETAL: No obvious deformities. No significant tenderness. Normal ROM 

throughout. 


BACK/SPINE: No midline tenderness or step-offs of the C/T/L spine. No 

costovertebral angle tenderness





- Vital Signs


Vital signs: 


                               Vital Signs - 12hr











  01/15/22 01/16/22 01/16/22





  23:18 03:21 09:40


 


Temperature 97.7 F 97.5 F L 


 


Pulse Rate 81 84 


 


Respiratory 18 20 





Rate   


 


Blood Pressure 115/65 110/63 


 


O2 Sat by Pulse 98 99 97





Oximetry   














- Lab





                                 01/14/22 08:20





                                 01/15/22 23:05


                             Most recent lab results











ABG pH  7.520  (7.320-7.450)  H  01/15/22  17:35    


 


ABG O2 Saturation  90.7  (0-100)   01/15/22  17:35    


 


Calcium  8.4 mg/dL (8.4-10.2)   01/15/22  23:05    


 


Phosphorus  5.30 mg/dL (2.5-4.5)  H  01/13/22  13:12    


 


Magnesium  2.10 mg/dL (1.7-2.3)   01/13/22  13:12    














Medications & Allergies





- Medications


Allergies/Adverse Reactions: 


                                    Allergies





No Known Allergies Allergy (Unverified 01/08/22 05:34)


   








Home Medications: 


                                Home Medications











 Medication  Instructions  Recorded  Confirmed  Last Taken  Type


 


No Known Home Medications [No  01/12/22 01/12/22 Unknown History





Reported Home Medications]     











Active Medications: 














Generic Name Dose Route Start Last Admin





  Trade Name Freq  PRN Reason Stop Dose Admin


 


Acetaminophen  650 mg  01/08/22 23:52 





  Acetaminophen 325 Mg Tab  PO  





  Q4H PRN  





  Pain MILD(1-3)/Fever >100.5/HA  


 


Famotidine  20 mg  01/14/22 22:00  01/15/22 22:50





  Famotidine 20 Mg Tab  PO   Not Given





  BID GERMANIA  


 


Metoclopramide HCl  10 mg  01/08/22 23:52  01/12/22 23:20





  Metoclopramide 10 Mg/2 Ml Inj  IV   10 mg





  Q6H PRN   Administration





  Nausea And Vomiting  


 


Ondansetron HCl  4 mg  01/08/22 23:52 





  Ondansetron 4 Mg/2 Ml Inj  IV  





  Q8H PRN  





  Nausea And Vomiting  


 


Oxycodone/Acetaminophen  1 tab  01/08/22 23:52  01/12/22 04:04





  Oxycodone /Acetaminophen 5-325mg Tab  PO   1 tab





  Q6H PRN   Administration





  Pain, Moderate (4-6)  


 


Sodium Chloride  10 ml  01/08/22 23:52  01/15/22 22:50





  Sodium Chloride 0.9% 10 Ml Flush Syringe  IV   10 ml





  PRN PRN   Administration





  LINE FLUSH  


 


Sodium Chloride  1 gm  01/14/22 20:00  01/15/22 22:50





  Sodium Chloride 1 Gm Tab  PO   Not Given





  TID GERMANIA

## 2022-01-16 NOTE — PROGRESS NOTE
Assessment and Plan








End-stage liver disease (decompensated)


Severe hyponatremia


Hypotension


Possible liver cirrhosis


Acute exacerbation of congestive heart failure


Obesity


CAD


Thrombocytopenia


Elevated serum transaminases


Hyperbilirubinemia


Hyperkalemia





- get 2D ECHO and address


- nephrology input appreciated


- follow repeat BMP


- get ABG to assess ventilation


- follow CXR re: ? aspiration


- continue care as below otherwise;





- continue accuchecks with glycemic control per SSI (While critically ill target

blood glucose of < 180 mg/dL; avoid hypoglycemia)


- continue to wean supplemental oxygen for target O2 sat's > 90% acutely


- aspiration precautions


- prn bronchodilators (YOEL) with pulm hygiene per RT


- wean per pulmonary driven protocols otherwise


- avoid nephrotoxins, renally dose all medications


- AB's per ID rec's 


- prn analgesia per CPOT score


- Maintenance of sleep-wake cycle, avoid delirium


- continue enteral nutritional support at goal rate as tolerated


- G.I. & VTE prophylaxis


- PT/OT/ROM exercises


- continue mobility protocols for pressure ulcer prophylaxis


- Monitor hemodynamics closely


- continue other care per attending / other consultants


- discharge planning ongoing concurrently





COVID SPECIFIC INTERVENTIONS


- COVID-19 test negative 





.... Re-evaluate in am & prn





CONDITION: CRITICAL


PROGNOSIS: GUARDED


CODE STATUS: FULL CODE





The high probability of a clinically significant, sudden or life-threatening 

deterioration of the [respiratory, cardiovascular & renal] system(s) required my

full and direct attention, intervention and personal management. The aggregate 

critical care time was [32] minutes without overlap. Time includes spent on;





[x] Data Review and interpretation 





[x] Patient assessment and monitoring of vital signs 





[x] Documentation 





[x] Medication orders and management











Subjective


Date of service: 01/16/22


Principal diagnosis: ESLD; Hyponatremia; AE-CHF; Obesity; CAD; Thrombocytopenia;

 Hyperkalemia


Interval history: 





Patient is seen today for: ESLD;  Hyponatremia; Hypotension; AE-CHF; Obesity; 

CAD; Thrombocytopenia; Hyperbilirubinemia; Hyperkalemia





Seen and examined at bedside; 24hour events reviewed; nursing and respiratory 

care staff consulted; no adverse overnight events reported to me; resting 

peacefully in bed;





Objective


                               Vital Signs - 12hr











  01/16/22





  09:40


 


O2 Sat by Pulse 97





Oximetry 











Constitutional: no acute distress, other (mildl hypoventilation / respiratory 

effort at rest)


Eyes: icteric


ENT: oropharynx moist


Neck: supple, no lymphadenopathy


Effort: mildly labored


Ascultation: Bilateral: diminished breath sounds


Percussion: Bilateral: not dull


Cardiovascular: regular rate and rhythm


Gastrointestinal: normoactive bowel sounds, soft, non-tender, non-distended 

(protuberant)


Integumentary: erythema


Extremities: no cyanosis, pink and warm, pulses normal, edema (2+)


Neurologic: non-focal exam (grossly), pupils equal and round, CN II-XII normal, 

other (lethargic)


Psychiatric: depressed


CBC and BMP: 


                                 01/14/22 08:20





                                 01/15/22 23:05


ABG, PT/INR, D-dimer: 


ABG











ABG pH  7.520  (7.320-7.450)  H  01/15/22  17:35    


 


POC ABG pCO2  33.5 mmHg (32.0-48.0)   01/15/22  17:35    


 


POC ABG pO2  57.7 mmHg ()  L  01/15/22  17:35    


 


POC ABG HCO3  26.7   01/15/22  17:35    


 


ABG O2 Saturation  90.7  (0-100)   01/15/22  17:35    





PT/INR, D-dimer











PT  29.5 Sec. (12.2-14.9)  H  01/15/22  23:05    


 


INR  2.54  (0.87-1.13)  H  01/15/22  23:05    








Abnormal lab findings: 


                                  Abnormal Labs











  01/08/22 01/08/22 01/08/22





  10:08 10:08 10:08


 


WBC   


 


Hgb  17.6 H  


 


Hct  53.0 H  


 


MCV  107 H  


 


MCH  35 H  


 


RDW  15.7 H  


 


Plt Count  82 L  


 


[place] % (Auto)   


 


Seg Neuts % (Manual)  92.0 H  


 


Lymphocytes % (Manual)  4.0 L  


 


Monocytes % (Manual)   


 


Seg Neutrophils # Man   


 


Lymphocytes # (Manual)  0.3 L  


 


Monocytes # (Manual)   


 


PT   21.0 H 


 


INR   1.64 H 


 


ABG pH   


 


POC ABG pO2   


 


ABG Oxyhemoglobin   


 


ABG Chloride   


 


ABG Glucose   


 


Carboxyhemoglobin   


 


Sodium   


 


Potassium   


 


Chloride   


 


Carbon Dioxide   


 


BUN   


 


Creatinine   


 


Glucose   


 


POC Glucose   


 


Lactic Acid   


 


Calcium   


 


Phosphorus   


 


Magnesium   


 


Total Bilirubin   


 


Direct Bilirubin   


 


AST   


 


ALT   


 


Alkaline Phosphatase   


 


Ammonia   


 


NT-Pro-B Natriuret Pep    2571 H


 


Total Protein   


 


Albumin   


 


Arterial Blood Glucose   


 


Salicylates   


 


Acetaminophen   














  01/08/22 01/08/22 01/08/22





  10:10 12:34 12:34


 


WBC   


 


Hgb   


 


Hct   


 


MCV   


 


MCH   


 


RDW   


 


Plt Count   


 


Mono % (Auto)   


 


Seg Neuts % (Manual)   


 


Lymphocytes % (Manual)   


 


Monocytes % (Manual)   


 


Seg Neutrophils # Man   


 


Lymphocytes # (Manual)   


 


Monocytes # (Manual)   


 


PT   


 


INR   


 


ABG pH   


 


POC ABG pO2   


 


ABG Oxyhemoglobin   


 


ABG Chloride   


 


ABG Glucose   


 


Carboxyhemoglobin   


 


Sodium  112 L*  


 


Potassium   


 


Chloride  77.6 L  


 


Carbon Dioxide   


 


BUN   


 


Creatinine   


 


Glucose   


 


POC Glucose   


 


Lactic Acid   


 


Calcium  8.3 L  


 


Phosphorus   


 


Magnesium  1.60 L  


 


Total Bilirubin  8.90 H  


 


Direct Bilirubin  3.5 H  


 


AST  118 H  


 


ALT  58 H  


 


Alkaline Phosphatase  359 H  


 


Ammonia   


 


NT-Pro-B Natriuret Pep   


 


Total Protein   


 


Albumin  2.9 L  


 


Arterial Blood Glucose   


 


Salicylates   < 0.3 L 


 


Acetaminophen    5.0 L














  01/09/22 01/09/22 01/09/22





  04:58 04:58 19:50


 


WBC  11.9 H  


 


Hgb  16.8 H  


 


Hct  50.2 H  


 


MCV  107 H  


 


MCH  36 H  


 


RDW  15.9 H  


 


Plt Count  94 L  


 


[place] % (Auto)   


 


Seg Neuts % (Manual)  94.0 H  


 


Lymphocytes % (Manual)  5.0 L  


 


Monocytes % (Manual)   


 


Seg Neutrophils # Man  11.2 H  


 


Lymphocytes # (Manual)  0.6 L  


 


Monocytes # (Manual)   


 


PT   


 


INR   


 


ABG pH   


 


POC ABG pO2   


 


ABG Oxyhemoglobin   


 


ABG Chloride   


 


ABG Glucose   


 


Carboxyhemoglobin   


 


Sodium   116 L* 


 


Potassium   5.7 H 


 


Chloride   81.4 L 


 


Carbon Dioxide   


 


BUN   21 H 


 


Creatinine   


 


Glucose   55 L 


 


POC Glucose   


 


Lactic Acid    2.90 H*


 


Calcium   8.3 L 


 


Phosphorus   


 


Magnesium   


 


Total Bilirubin   9.80 H 


 


Direct Bilirubin   


 


AST   129 H 


 


ALT   60 H 


 


Alkaline Phosphatase   303 H 


 


Ammonia   


 


NT-Pro-B Natriuret Pep   


 


Total Protein   


 


Albumin   2.5 L 


 


Arterial Blood Glucose   


 


Salicylates   


 


Acetaminophen   














  01/09/22 01/09/22 01/10/22





  22:21 22:21 05:20


 


WBC   


 


Hgb  15.6 H  


 


Hct  46.9 H  


 


MCV  109 H   108 H


 


MCH  36 H   36 H


 


RDW  16.1 H   16.0 H


 


Plt Count  66 L   67 L


 


Mono % (Auto)  13.6 H  


 


Seg Neuts % (Manual)    82.0 H


 


Lymphocytes % (Manual)    12.0 L


 


Monocytes % (Manual)   


 


Seg Neutrophils # Man   


 


Lymphocytes # (Manual)    1.0 L


 


Monocytes # (Manual)   


 


PT   


 


INR   


 


ABG pH   


 


POC ABG pO2   


 


ABG Oxyhemoglobin   


 


ABG Chloride   


 


ABG Glucose   


 


Carboxyhemoglobin   


 


Sodium   120 L 


 


Potassium   5.6 H 


 


Chloride   89.1 L 


 


Carbon Dioxide   18 L 


 


BUN   28 H 


 


Creatinine   


 


Glucose   121 H 


 


POC Glucose   


 


Lactic Acid   


 


Calcium   7.5 L 


 


Phosphorus   


 


Magnesium   


 


Total Bilirubin   7.90 H 


 


Direct Bilirubin   


 


AST   115 H 


 


ALT   


 


Alkaline Phosphatase   304 H 


 


Ammonia   


 


NT-Pro-B Natriuret Pep   


 


Total Protein   5.6 L 


 


Albumin   2.1 L 


 


Arterial Blood Glucose   


 


Salicylates   


 


Acetaminophen   














  01/10/22 01/11/22 01/11/22





  05:20 11:49 15:07


 


WBC   


 


Hgb    15.6 H


 


Hct    47.3 H


 


MCV    108 H


 


MCH    36 H


 


RDW    16.3 H


 


Plt Count    40 L


 


Mono % (Auto)   


 


Seg Neuts % (Manual)    80.0 H


 


Lymphocytes % (Manual)    6.0 L


 


Monocytes % (Manual)    14.0 H


 


Seg Neutrophils # Man    7.8 H


 


Lymphocytes # (Manual)    0.6 L


 


Monocytes # (Manual)    1.4 H


 


PT   


 


INR   


 


ABG pH   


 


POC ABG pO2   


 


ABG Oxyhemoglobin   


 


ABG Chloride   


 


ABG Glucose   


 


Carboxyhemoglobin   


 


Sodium  124 L  


 


Potassium   


 


Chloride  90.8 L  


 


Carbon Dioxide   


 


BUN  30 H  


 


Creatinine   


 


Glucose   


 


POC Glucose   107 H 


 


Lactic Acid   


 


Calcium  8.0 L  


 


Phosphorus   


 


Magnesium   


 


Total Bilirubin  6.70 H  


 


Direct Bilirubin   


 


AST  97 H  


 


ALT   


 


Alkaline Phosphatase  318 H  


 


Ammonia   


 


NT-Pro-B Natriuret Pep   


 


Total Protein  5.1 L  


 


Albumin  2.2 L  


 


Arterial Blood Glucose   


 


Salicylates   


 


Acetaminophen   














  01/11/22 01/13/22 01/13/22





  15:07 13:12 13:12


 


WBC   


 


Hgb    16.7 H


 


Hct    51.5 H


 


MCV    111 H


 


MCH    36 H


 


RDW    16.6 H


 


Plt Count    60 L


 


Mono % (Auto)   


 


Seg Neuts % (Manual)    89.0 H


 


Lymphocytes % (Manual)    2.0 L


 


Monocytes % (Manual)   


 


Seg Neutrophils # Man    9.3 H


 


Lymphocytes # (Manual)    0.2 L


 


Monocytes # (Manual)   


 


PT   


 


INR   


 


ABG pH   


 


POC ABG pO2   


 


ABG Oxyhemoglobin   


 


ABG Chloride   


 


ABG Glucose   


 


Carboxyhemoglobin   


 


Sodium  123 L  


 


Potassium   


 


Chloride  93.7 L  


 


Carbon Dioxide  19 L  


 


BUN  22 H  


 


Creatinine  0.5 L D  


 


Glucose  136 H  


 


POC Glucose   


 


Lactic Acid   


 


Calcium  8.2 L  


 


Phosphorus   5.30 H 


 


Magnesium   


 


Total Bilirubin  12.20 H  


 


Direct Bilirubin   


 


AST  102 H  


 


ALT  57 H  


 


Alkaline Phosphatase  296 H  


 


Ammonia   


 


NT-Pro-B Natriuret Pep   


 


Total Protein  5.3 L  


 


Albumin  2.3 L  


 


Arterial Blood Glucose   


 


Salicylates   


 


Acetaminophen   














  01/13/22 01/13/22 01/14/22





  13:12 13:12 08:20


 


WBC    13.1 H


 


Hgb    17.2 H


 


Hct    53.6 H


 


MCV    111 H


 


MCH    36 H


 


RDW    17.1 H


 


Plt Count    26 L


 


[place] % (Auto)   


 


Seg Neuts % (Manual)    86.0 H


 


Lymphocytes % (Manual)    8.0 L


 


Monocytes % (Manual)   


 


Seg Neutrophils # Man    11.3 H


 


Lymphocytes # (Manual)    1.0 L


 


Monocytes # (Manual)   


 


PT   


 


INR   


 


ABG pH   


 


POC ABG pO2   


 


ABG Oxyhemoglobin   


 


ABG Chloride   


 


ABG Glucose   


 


Carboxyhemoglobin   


 


Sodium  119 L*  123 L 


 


Potassium  7.6 H* D  6.8 H* 


 


Chloride  90.9 L  93.0 L 


 


Carbon Dioxide  16 L  13 L 


 


BUN  31 H  33 H 


 


Creatinine   


 


Glucose  62 L  60 L 


 


POC Glucose   


 


Lactic Acid   


 


Calcium   


 


Phosphorus   


 


Magnesium   


 


Total Bilirubin  11.90 H  


 


Direct Bilirubin   


 


AST  124 H  


 


ALT   


 


Alkaline Phosphatase  303 H  


 


Ammonia   


 


NT-Pro-B Natriuret Pep   


 


Total Protein   


 


Albumin  2.3 L  


 


Arterial Blood Glucose   


 


Salicylates   


 


Acetaminophen   














  01/14/22 01/14/22 01/14/22





  08:20 21:03 21:03


 


WBC   


 


Hgb   


 


Hct   


 


MCV   


 


MCH   


 


RDW   


 


Plt Count   


 


Mono % (Auto)   


 


Seg Neuts % (Manual)   


 


Lymphocytes % (Manual)   


 


Monocytes % (Manual)   


 


Seg Neutrophils # Man   


 


Lymphocytes # (Manual)   


 


Monocytes # (Manual)   


 


PT   


 


INR   


 


ABG pH   


 


POC ABG pO2   


 


ABG Oxyhemoglobin   


 


ABG Chloride   


 


ABG Glucose   


 


Carboxyhemoglobin   


 


Sodium  123 L   127 L


 


Potassium  7.1 H*   5.7 H


 


Chloride  92.0 L   93.3 L


 


Carbon Dioxide  16 L   20 L


 


BUN  39 H   45 H


 


Creatinine    1.4 H


 


Glucose  58 L   47 L


 


POC Glucose   


 


Lactic Acid   


 


Calcium   


 


Phosphorus   


 


Magnesium   


 


Total Bilirubin  13.40 H  


 


Direct Bilirubin   


 


AST  174 H  


 


ALT  75 H  


 


Alkaline Phosphatase  312 H  


 


Ammonia   249.0 H 


 


NT-Pro-B Natriuret Pep   


 


Total Protein  6.0 L  


 


Albumin  2.4 L  


 


Arterial Blood Glucose   


 


Salicylates   


 


Acetaminophen   














  01/15/22 01/15/22 01/15/22





  11:47 17:35 23:05


 


WBC   


 


Hgb   


 


Hct   


 


MCV   


 


MCH   


 


RDW   


 


Plt Count   


 


Mono % (Auto)   


 


Seg Neuts % (Manual)   


 


Lymphocytes % (Manual)   


 


Monocytes % (Manual)   


 


Seg Neutrophils # Man   


 


Lymphocytes # (Manual)   


 


Monocytes # (Manual)   


 


PT    29.5 H


 


INR    2.54 H


 


ABG pH   7.520 H 


 


POC ABG pO2   57.7 L 


 


ABG Oxyhemoglobin   88.2 L 


 


ABG Chloride   94.0 L 


 


ABG Glucose   113 H 


 


Carboxyhemoglobin   2.5 H 


 


Sodium   


 


Potassium   


 


Chloride   


 


Carbon Dioxide   


 


BUN   


 


Creatinine   


 


Glucose   


 


POC Glucose  126 H  


 


Lactic Acid   


 


Calcium   


 


Phosphorus   


 


Magnesium   


 


Total Bilirubin   


 


Direct Bilirubin   


 


AST   


 


ALT   


 


Alkaline Phosphatase   


 


Ammonia   


 


NT-Pro-B Natriuret Pep   


 


Total Protein   


 


Albumin   


 


Arterial Blood Glucose   113 H 


 


Salicylates   


 


Acetaminophen   














  01/15/22 01/15/22





  23:05 23:05


 


WBC  


 


Hgb  


 


Hct  


 


MCV  


 


MCH  


 


RDW  


 


Plt Count  


 


Mono % (Auto)  


 


Seg Neuts % (Manual)  


 


Lymphocytes % (Manual)  


 


Monocytes % (Manual)  


 


Seg Neutrophils # Man  


 


Lymphocytes # (Manual)  


 


Monocytes # (Manual)  


 


PT  


 


INR  


 


ABG pH  


 


POC ABG pO2  


 


ABG Oxyhemoglobin  


 


ABG Chloride  


 


ABG Glucose  


 


Carboxyhemoglobin  


 


Sodium   129 L


 


Potassium  


 


Chloride   92.5 L


 


Carbon Dioxide  


 


BUN   39 H


 


Creatinine  


 


Glucose   115 H


 


POC Glucose  


 


Lactic Acid  


 


Calcium  


 


Phosphorus  


 


Magnesium  


 


Total Bilirubin  


 


Direct Bilirubin  


 


AST  


 


ALT  


 


Alkaline Phosphatase  


 


Ammonia  77.0 H 


 


NT-Pro-B Natriuret Pep  


 


Total Protein  


 


Albumin  


 


Arterial Blood Glucose  


 


Salicylates  


 


Acetaminophen  











Allied health notes reviewed: nursing

## 2022-01-16 NOTE — DEATH NOTE
Death Note


Date of Death: 22


Time of Death: 20:00


Time Pronounced: 20:10





- Preliminary Cause of Death (problem)


(1) End stage liver disease


Nursing staff notified that patient had absent heartbeat. I presented to 

bedside. On neurologic exam patient found to have absent brainstem reflexes. 

Patient pupils are fixed and dilated. Pulmonary exam revealed absent breath 

sounds. Cardiac exam revealed absent heart sounds. Patient pronounced  

at 2000 hrs.

## 2022-01-18 NOTE — ELECTROCARDIOGRAPH REPORT
[facility]

                                       

Test Date:    2022               Test Time:    14:19:09

Pat Name:     ASHWIN CID             Department:   

Patient ID:   [id number]          Room:         A485 1

Gender:       M                        Technician:   LEANNA

:          [id number]               Requested By: JORDAN MENDOZA

Order Number: [id number]              Reading MD:   Chadwick Oakley

                                 Measurements

Intervals                              Axis          

Rate:         76                       P:            41

RI:           152                      QRS:          125

QRSD:         104                      T:            36

QT:           402                                    

QTc:          452                                    

                           Interpretive Statements

Sinus rhythm

Probable left atrial enlargement

Right ventricular hypertrophy

Compared to ECG 2022 11:35:23

Right ventricular hypertrophy now present

Right bundle-branch block no longer present

Right-axis deviation no longer present

Electronically Signed On 2022 9:15:16 EST by Chadwick Oakley

## 2022-01-24 ENCOUNTER — TELEPHONE ENCOUNTER (OUTPATIENT)
Dept: URBAN - METROPOLITAN AREA CLINIC 40 | Facility: CLINIC | Age: 47
End: 2022-01-24

## 2022-02-10 ENCOUNTER — TELEPHONE ENCOUNTER (OUTPATIENT)
Dept: URBAN - METROPOLITAN AREA CLINIC 82 | Facility: CLINIC | Age: 47
End: 2022-02-10

## 2022-04-07 ENCOUNTER — OFFICE VISIT (OUTPATIENT)
Dept: URBAN - METROPOLITAN AREA CLINIC 82 | Facility: CLINIC | Age: 47
End: 2022-04-07

## 2023-03-31 NOTE — PHYSICAL EXAM NEUROLOGIC:
oriented to person, place and time , normal sensation , short and long term memory intact
Primary ciliary dyskinesia